# Patient Record
Sex: FEMALE | Race: WHITE | NOT HISPANIC OR LATINO | Employment: FULL TIME | URBAN - METROPOLITAN AREA
[De-identification: names, ages, dates, MRNs, and addresses within clinical notes are randomized per-mention and may not be internally consistent; named-entity substitution may affect disease eponyms.]

---

## 2018-10-10 ENCOUNTER — OFFICE VISIT (OUTPATIENT)
Dept: OBGYN CLINIC | Facility: CLINIC | Age: 28
End: 2018-10-10
Payer: COMMERCIAL

## 2018-10-10 VITALS
SYSTOLIC BLOOD PRESSURE: 124 MMHG | DIASTOLIC BLOOD PRESSURE: 78 MMHG | HEIGHT: 66 IN | WEIGHT: 201.6 LBS | BODY MASS INDEX: 32.4 KG/M2

## 2018-10-10 DIAGNOSIS — N91.2 AMENORRHEA: Primary | ICD-10-CM

## 2018-10-10 PROCEDURE — 76817 TRANSVAGINAL US OBSTETRIC: CPT | Performed by: OBSTETRICS & GYNECOLOGY

## 2018-10-10 PROCEDURE — 99213 OFFICE O/P EST LOW 20 MIN: CPT | Performed by: OBSTETRICS & GYNECOLOGY

## 2018-10-10 NOTE — PROGRESS NOTES
Assessment/Plan:      Diagnoses and all orders for this visit:    Amenorrhea        Ultrasound findings were reviewed with the patient and her   There is a single intrauterine pregnancy sac noted  There is a normal appearing yolk sac noted as well  Given these findings it is highly suggestive of a very early pregnancy  As we reviewed their suggested date of conception this would be consistent with a 4-5 week gestation  Patient will return to the office in 2 weeks for repeat sonographic evaluation to confirm viability  Subjective:     Patient ID: Josie Lane is a 33 y o  female  Patient presents as a new patient for evaluation of amenorrhea  Patient has a 1st a last menstrual period of August 1, 2018  She does note that she did have bleeding around the 1st week of September as well  She has not had menses since and has had positive home pregnancy test   She does note breast discomfort as well as some mild nausea  This is a happy event for the couple  Review of Systems   All other systems reviewed and are negative  Objective:     Physical Exam   Constitutional: She appears well-developed and well-nourished  Abdominal: Soft  There is no tenderness  Genitourinary: Vagina normal and uterus normal    Skin: Skin is warm and dry  Psychiatric: She has a normal mood and affect  Her behavior is normal  Judgment and thought content normal        Early OB Ultrasound Procedure Note  Josie Lane  YOB: 1990      Referring Physician: Self, Referral     Technician: Study performed by the interpreting physician    Indications:  amenorrhea   /78 (BP Location: Left arm, Patient Position: Sitting, Cuff Size: Standard)   Ht 5' 5 5" (1 664 m)   Wt 91 4 kg (201 lb 9 6 oz)   LMP 08/01/2018 (Exact Date)   BMI 33 04 kg/m²     Patient's last menstrual period was 08/01/2018 (exact date)  , , with EGA of  10 weeks and 0   days    Patient admits to much improved vaginal bleeding or brown discharge      Procedure Details  A gestational sac is seen containing a yolk sac and a segura embryo    Subchorionic lucency is absent     The embryonic crown-rump length measures   cm  and calculates to an estimated gestational age of n/a weeks and 0   days     Embryonic cardiac activity is  n/a  Description of fetal anatomy no fetal pole noted; nl yolk sac seen  Description of ovaries: normal  Description of uterus: normal    Findings:  Viable, segura intrauterine pregnancy at less than 5 weeks,  0 days, with a calculated EDC of unknown date

## 2018-10-24 ENCOUNTER — OFFICE VISIT (OUTPATIENT)
Dept: OBGYN CLINIC | Facility: CLINIC | Age: 28
End: 2018-10-24
Payer: COMMERCIAL

## 2018-10-24 VITALS — WEIGHT: 198.8 LBS | DIASTOLIC BLOOD PRESSURE: 62 MMHG | BODY MASS INDEX: 32.58 KG/M2 | SYSTOLIC BLOOD PRESSURE: 118 MMHG

## 2018-10-24 DIAGNOSIS — N91.2 AMENORRHEA: ICD-10-CM

## 2018-10-24 PROCEDURE — 99213 OFFICE O/P EST LOW 20 MIN: CPT | Performed by: PHYSICIAN ASSISTANT

## 2018-10-24 PROCEDURE — 76801 OB US < 14 WKS SINGLE FETUS: CPT | Performed by: PHYSICIAN ASSISTANT

## 2018-10-24 NOTE — PROGRESS NOTES
Early OB Ultrasound Procedure Note  Isabel Weber    HPI  Patient here for repeat US, she was here 2 wks ago but US not able to date pregnancy due to early size  Patient has h/o irregular menses  Referring Physician: No ref  provider found  Technician: Study performed by the interpreting physician assistant    Indications:  amenorrhea   /62 (BP Location: Left arm)   Wt 90 2 kg (198 lb 12 8 oz)   LMP 2018 (Exact Date)   BMI 32 58 kg/m²     Patient's last menstrual period was 2018 (exact date)  with EGA of  12 weeks and 0 days        Procedure Details  A gestational sac is seen containing a yolk sac and a segura embryo  The embryonic crown-rump length measures 1 05 cm  and calculates to an estimated gestational age of 9 weeks and 1   Days    Embryonic cardiac activity is present @ 129 BPM  Description of fetal anatomy Normal  Description of ovaries: normal  Description of uterus: normal    Findings:  Viable, segura intrauterine pregnancy at 7 weeks,  1 days, with a calculated EDC of 2019 by todays US

## 2018-10-24 NOTE — ASSESSMENT & PLAN NOTE
(+) viable IUP, size less than dates  EDMUND 6/11/19 by LMP  Patient congratulated and questions answered  RTO for interview and then PN1

## 2018-11-13 ENCOUNTER — INITIAL PRENATAL (OUTPATIENT)
Dept: OBGYN CLINIC | Facility: CLINIC | Age: 28
End: 2018-11-13

## 2018-11-13 VITALS
BODY MASS INDEX: 31.79 KG/M2 | HEIGHT: 66 IN | SYSTOLIC BLOOD PRESSURE: 110 MMHG | WEIGHT: 197.8 LBS | DIASTOLIC BLOOD PRESSURE: 68 MMHG

## 2018-11-13 DIAGNOSIS — Z34.01 ENCOUNTER FOR SUPERVISION OF NORMAL FIRST PREGNANCY IN FIRST TRIMESTER: Primary | ICD-10-CM

## 2018-11-13 PROCEDURE — OBC: Performed by: OBSTETRICS & GYNECOLOGY

## 2018-11-13 NOTE — PROGRESS NOTES
OB INTAKE INTERVIEW  * Pt presents for OB intake  * Accompanied by:  *  *Hx of  delivery prior to 36 weeks 6 days- no  *Last Menstrual Period: Pt's LMP was  *Ultrasound date: 10/24/18    7 weeks  1days  *Estimated date of delivery: 2018   * confirmed by  US    *Signs/Symptoms of Pregnancy   *constipation NO   *headaches no   *cramping/spotting no   *PICA cravings no  *Diabetes- if you answer yes, please order 1 hour GTT, 50g   *hx of GDM no   *BMI >35 no   *first degree relative with type 2 diabetes Yes   *hx of PCOS no   *current metformin use no   *prior hx of LGA/macrosomia no   *AMA with other risk factors no  *Hypertension- if you answer yes, please order preeclampsia labs including 24 hour urine protein   *Hx of chronic HTN no   *hx of gestational HTN no   *hx of preeclampsia, eclampsia, or HELLP syndrome no  *Immunizations:   *influenza vaccine given today no   *discussed Tdap vaccine- yes    *Interview education   *[facility] Pregnancy Essentials Book reviewed and discussed   *Handouts given:    *Baby and Me phone christopher guide    *Baby and Me support center    *[facility]     *discussed genetic testing- pt interested yes     *appointment at [facility] made yes    *Prenatal lab work scripts given to pt ; including one hour glucola  *I have these concerns about this prenatal patient: no    PN1 visit scheduled  The patient was oriented to our practice and all questions were answered  Pt & FOB happy with pregnancy  Pt to consider CF testing - information given & pt advised of NPO status one hour prior  Pt to consider flu vaccine, discussed importance of at visit       Interviewed by: Lord Ijeoma RN, [address] [place] [address]

## 2018-11-26 ENCOUNTER — APPOINTMENT (OUTPATIENT)
Dept: LAB | Facility: CLINIC | Age: 28
End: 2018-11-26
Payer: COMMERCIAL

## 2018-11-26 DIAGNOSIS — Z34.01 ENCOUNTER FOR SUPERVISION OF NORMAL FIRST PREGNANCY IN FIRST TRIMESTER: ICD-10-CM

## 2018-11-26 LAB
ABO GROUP BLD: NORMAL
BACTERIA UR QL AUTO: NORMAL /HPF
BASOPHILS # BLD AUTO: 0.03 THOUSANDS/ΜL (ref 0–0.1)
BASOPHILS NFR BLD AUTO: 0 % (ref 0–1)
BILIRUB UR QL STRIP: NEGATIVE
BLD GP AB SCN SERPL QL: NEGATIVE
CLARITY UR: CLEAR
COLOR UR: YELLOW
EOSINOPHIL # BLD AUTO: 0.16 THOUSAND/ΜL (ref 0–0.61)
EOSINOPHIL NFR BLD AUTO: 2 % (ref 0–6)
ERYTHROCYTE [DISTWIDTH] IN BLOOD BY AUTOMATED COUNT: 12.5 % (ref 11.6–15.1)
GLUCOSE 1H P 50 G GLC PO SERPL-MCNC: 112 MG/DL
GLUCOSE UR STRIP-MCNC: NEGATIVE MG/DL
HCT VFR BLD AUTO: 40.5 % (ref 34.8–46.1)
HGB BLD-MCNC: 13.3 G/DL (ref 11.5–15.4)
HGB UR QL STRIP.AUTO: NEGATIVE
HYALINE CASTS #/AREA URNS LPF: NORMAL /LPF
IMM GRANULOCYTES # BLD AUTO: 0.03 THOUSAND/UL (ref 0–0.2)
IMM GRANULOCYTES NFR BLD AUTO: 0 % (ref 0–2)
KETONES UR STRIP-MCNC: NEGATIVE MG/DL
LEUKOCYTE ESTERASE UR QL STRIP: NEGATIVE
LYMPHOCYTES # BLD AUTO: 1.87 THOUSANDS/ΜL (ref 0.6–4.47)
LYMPHOCYTES NFR BLD AUTO: 18 % (ref 14–44)
MCH RBC QN AUTO: 29.9 PG (ref 26.8–34.3)
MCHC RBC AUTO-ENTMCNC: 32.8 G/DL (ref 31.4–37.4)
MCV RBC AUTO: 91 FL (ref 82–98)
MONOCYTES # BLD AUTO: 0.47 THOUSAND/ΜL (ref 0.17–1.22)
MONOCYTES NFR BLD AUTO: 5 % (ref 4–12)
NEUTROPHILS # BLD AUTO: 7.72 THOUSANDS/ΜL (ref 1.85–7.62)
NEUTS SEG NFR BLD AUTO: 75 % (ref 43–75)
NITRITE UR QL STRIP: NEGATIVE
NON-SQ EPI CELLS URNS QL MICRO: NORMAL /HPF
NRBC BLD AUTO-RTO: 0 /100 WBCS
PH UR STRIP.AUTO: 7 [PH] (ref 4.5–8)
PLATELET # BLD AUTO: 280 THOUSANDS/UL (ref 149–390)
PMV BLD AUTO: 10.1 FL (ref 8.9–12.7)
PROT UR STRIP-MCNC: NEGATIVE MG/DL
RBC # BLD AUTO: 4.45 MILLION/UL (ref 3.81–5.12)
RBC #/AREA URNS AUTO: NORMAL /HPF
RH BLD: POSITIVE
RUBV IGG SERPL IA-ACNC: 40.6 IU/ML
SP GR UR STRIP.AUTO: 1.01 (ref 1–1.03)
SPECIMEN EXPIRATION DATE: NORMAL
UROBILINOGEN UR QL STRIP.AUTO: 0.2 E.U./DL
WBC # BLD AUTO: 10.28 THOUSAND/UL (ref 4.31–10.16)
WBC #/AREA URNS AUTO: NORMAL /HPF

## 2018-11-26 PROCEDURE — 80081 OBSTETRIC PANEL INC HIV TSTG: CPT

## 2018-11-26 PROCEDURE — 87086 URINE CULTURE/COLONY COUNT: CPT

## 2018-11-26 PROCEDURE — 36415 COLL VENOUS BLD VENIPUNCTURE: CPT

## 2018-11-26 PROCEDURE — 81001 URINALYSIS AUTO W/SCOPE: CPT

## 2018-11-26 PROCEDURE — 82950 GLUCOSE TEST: CPT

## 2018-11-27 LAB
HBV SURFACE AG SER QL: NORMAL
RPR SER QL: NORMAL

## 2018-11-28 LAB — BACTERIA UR CULT: NORMAL

## 2018-11-29 ENCOUNTER — INITIAL PRENATAL (OUTPATIENT)
Dept: OBGYN CLINIC | Facility: CLINIC | Age: 28
End: 2018-11-29

## 2018-11-29 VITALS — DIASTOLIC BLOOD PRESSURE: 68 MMHG | WEIGHT: 199 LBS | SYSTOLIC BLOOD PRESSURE: 116 MMHG | BODY MASS INDEX: 32.12 KG/M2

## 2018-11-29 DIAGNOSIS — Z34.90 PREGNANCY, UNSPECIFIED GESTATIONAL AGE: Primary | ICD-10-CM

## 2018-11-29 DIAGNOSIS — Z34.02 FIRST PREGNANCY, SECOND TRIMESTER: ICD-10-CM

## 2018-11-29 PROBLEM — N91.2 AMENORRHEA: Status: RESOLVED | Noted: 2018-10-10 | Resolved: 2018-11-29

## 2018-11-29 PROCEDURE — G0145 SCR C/V CYTO,THINLAYER,RESCR: HCPCS | Performed by: PHYSICIAN ASSISTANT

## 2018-11-29 PROCEDURE — PNV: Performed by: PHYSICIAN ASSISTANT

## 2018-11-29 NOTE — PROGRESS NOTES
Problem List Items Addressed This Visit        Other    First pregnancy, second trimester     33year old  here for first prenatal visit  Negative past medical history   Had nausea which has improved  Some mild cramping but no bleeding  Has genetic testing scheduled next week  First OB labs normal, blood type B+  Last Pap >5 years ago  Pap and cultures today              Other Visit Diagnoses     Pregnancy, unspecified gestational age    -  Primary    Relevant Orders    Liquid-based pap, screening    Chlamydia/GC amplified DNA by PCR

## 2018-11-29 NOTE — ASSESSMENT & PLAN NOTE
33year old  here for first prenatal visit  Negative past medical history   Had nausea which has improved  Some mild cramping but no bleeding  Has genetic testing scheduled next week  First OB labs normal, blood type B+  Last Pap >5 years ago  Pap and cultures today

## 2018-11-30 LAB — HIV 1+2 AB+HIV1 P24 AG SERPL QL IA: NORMAL

## 2018-12-03 ENCOUNTER — ROUTINE PRENATAL (OUTPATIENT)
Dept: PERINATAL CARE | Facility: CLINIC | Age: 28
End: 2018-12-03
Payer: COMMERCIAL

## 2018-12-03 VITALS
DIASTOLIC BLOOD PRESSURE: 59 MMHG | WEIGHT: 200 LBS | HEART RATE: 91 BPM | HEIGHT: 66 IN | BODY MASS INDEX: 32.14 KG/M2 | SYSTOLIC BLOOD PRESSURE: 140 MMHG

## 2018-12-03 DIAGNOSIS — O99.210 OBESITY AFFECTING PREGNANCY, ANTEPARTUM: Primary | ICD-10-CM

## 2018-12-03 DIAGNOSIS — Z36.82 ENCOUNTER FOR NUCHAL TRANSLUCENCY TESTING: ICD-10-CM

## 2018-12-03 DIAGNOSIS — Z34.01 ENCOUNTER FOR SUPERVISION OF NORMAL FIRST PREGNANCY IN FIRST TRIMESTER: ICD-10-CM

## 2018-12-03 DIAGNOSIS — Z3A.12 12 WEEKS GESTATION OF PREGNANCY: ICD-10-CM

## 2018-12-03 PROCEDURE — 76813 OB US NUCHAL MEAS 1 GEST: CPT | Performed by: OBSTETRICS & GYNECOLOGY

## 2018-12-03 PROCEDURE — 99241 PR OFFICE CONSULTATION NEW/ESTAB PATIENT 15 MIN: CPT | Performed by: OBSTETRICS & GYNECOLOGY

## 2018-12-03 RX ORDER — ASPIRIN 81 MG/1
81 TABLET ORAL DAILY
Qty: 100 TABLET | Refills: 3 | Status: SHIPPED | OUTPATIENT
Start: 2018-12-03 | End: 2018-12-31

## 2018-12-03 NOTE — PROGRESS NOTES
The patient was seen today for an ultrasound  Please see ultrasound report (located under Ob Procedures) for additional details  Thank you very much for allowing us to participate in the care of this very nice patient  Should you have any questions, please do not hesitate to contact me  Nikita Red MD [place]  Attending Physician, [place]

## 2018-12-05 LAB
LAB AP GYN PRIMARY INTERPRETATION: NORMAL
Lab: NORMAL

## 2018-12-14 ENCOUNTER — TELEPHONE (OUTPATIENT)
Dept: PERINATAL CARE | Facility: CLINIC | Age: 28
End: 2018-12-14

## 2018-12-28 ENCOUNTER — APPOINTMENT (OUTPATIENT)
Dept: LAB | Facility: HOSPITAL | Age: 28
End: 2018-12-28
Attending: OBSTETRICS & GYNECOLOGY
Payer: COMMERCIAL

## 2018-12-28 ENCOUNTER — ULTRASOUND (OUTPATIENT)
Dept: PERINATAL CARE | Facility: CLINIC | Age: 28
End: 2018-12-28
Payer: COMMERCIAL

## 2018-12-28 ENCOUNTER — ROUTINE PRENATAL (OUTPATIENT)
Dept: OBGYN CLINIC | Facility: CLINIC | Age: 28
End: 2018-12-28
Payer: COMMERCIAL

## 2018-12-28 VITALS — WEIGHT: 200.2 LBS | SYSTOLIC BLOOD PRESSURE: 122 MMHG | BODY MASS INDEX: 32.31 KG/M2 | DIASTOLIC BLOOD PRESSURE: 74 MMHG

## 2018-12-28 VITALS — SYSTOLIC BLOOD PRESSURE: 126 MMHG | HEART RATE: 109 BPM | DIASTOLIC BLOOD PRESSURE: 80 MMHG

## 2018-12-28 DIAGNOSIS — Z36.89 CONFIRM FETAL CARDIAC ACTIVITY USING ULTRASOUND: ICD-10-CM

## 2018-12-28 DIAGNOSIS — Z36.2: ICD-10-CM

## 2018-12-28 DIAGNOSIS — Z3A.16 16 WEEKS GESTATION OF PREGNANCY: ICD-10-CM

## 2018-12-28 DIAGNOSIS — O02.1 FETAL DEMISE BEFORE 20 WEEKS WITH RETENTION OF DEAD FETUS: Primary | ICD-10-CM

## 2018-12-28 DIAGNOSIS — Z34.92 ENCOUNTER FOR SUPERVISION OF NORMAL PREGNANCY IN SECOND TRIMESTER, UNSPECIFIED GRAVIDITY: Primary | ICD-10-CM

## 2018-12-28 DIAGNOSIS — O99.210 OBESITY AFFECTING PREGNANCY, ANTEPARTUM: ICD-10-CM

## 2018-12-28 DIAGNOSIS — Z34.91 ENCOUNTER FOR PREGNANCY RELATED EXAMINATION IN FIRST TRIMESTER: ICD-10-CM

## 2018-12-28 DIAGNOSIS — Z23 NEED FOR IMMUNIZATION AGAINST INFLUENZA: ICD-10-CM

## 2018-12-28 DIAGNOSIS — O02.1 FETAL DEMISE BEFORE 20 WEEKS WITH RETENTION OF DEAD FETUS: ICD-10-CM

## 2018-12-28 LAB — GLUCOSE AMN-MCNC: 39 MG/DL

## 2018-12-28 PROCEDURE — 76815 OB US LIMITED FETUS(S): CPT | Performed by: OBSTETRICS & GYNECOLOGY

## 2018-12-28 PROCEDURE — 87591 N.GONORRHOEAE DNA AMP PROB: CPT | Performed by: NURSE PRACTITIONER

## 2018-12-28 PROCEDURE — 82945 GLUCOSE OTHER FLUID: CPT

## 2018-12-28 PROCEDURE — 59000 AMNIOCENTESIS DIAGNOSTIC: CPT | Performed by: OBSTETRICS & GYNECOLOGY

## 2018-12-28 PROCEDURE — 90471 IMMUNIZATION ADMIN: CPT | Performed by: NURSE PRACTITIONER

## 2018-12-28 PROCEDURE — PNV: Performed by: NURSE PRACTITIONER

## 2018-12-28 PROCEDURE — 87205 SMEAR GRAM STAIN: CPT

## 2018-12-28 PROCEDURE — 76946 ECHO GUIDE FOR AMNIOCENTESIS: CPT | Performed by: OBSTETRICS & GYNECOLOGY

## 2018-12-28 PROCEDURE — 87491 CHLMYD TRACH DNA AMP PROBE: CPT | Performed by: NURSE PRACTITIONER

## 2018-12-28 PROCEDURE — 99243 OFF/OP CNSLTJ NEW/EST LOW 30: CPT | Performed by: OBSTETRICS & GYNECOLOGY

## 2018-12-28 PROCEDURE — 87070 CULTURE OTHR SPECIMN AEROBIC: CPT

## 2018-12-28 PROCEDURE — 90688 IIV4 VACCINE SPLT 0.5 ML IM: CPT | Performed by: NURSE PRACTITIONER

## 2018-12-28 NOTE — ASSESSMENT & PLAN NOTE
Absent heart tones on Doppler and no cardiac activity observed on bedside US  Discussed with patient and spouse and emotional support provided  Phone report to [facility] RN staff   To [facility] for further eval

## 2018-12-28 NOTE — PROGRESS NOTES
Amniocentesis performed with ultrasound guidance by Dr Nohemy Nguyen post time out with pt  Verbal and written discharge instructions given to pt and  post procedure  Pt receptive and verbalizes understanding   All questions answered and follow up appt made prior to discharge

## 2018-12-28 NOTE — PROGRESS NOTES
[id number] [address]: Ms Lyell Libman was seen today at 16w3d for viability scan; demise was confirmed and amniocentesis was performed     See ultrasound report under "OB Procedures" tab  The face-to-face time was 40 minutes  The majority of time (>50%) was spent counseling and/or coordinating care with the patient and/or family members  Please don't hesitate to contact our office with any concerns or questions    Latonia Pringle MD

## 2018-12-28 NOTE — ASSESSMENT & PLAN NOTE
Denies OB complaints  Denies contractions, cramping, leakage of fluid or vaginal bleeding  Flu vaccine was administered today

## 2018-12-28 NOTE — PATIENT INSTRUCTIONS
I am so sorry for your loss  We will call you as results of your amniocentesis return  Please be watchful for fever, cramping, bleeding, or any pain or discomfort  Your doctor's office will contact you regarding the next steps in management

## 2018-12-31 ENCOUNTER — OFFICE VISIT (OUTPATIENT)
Dept: OBGYN CLINIC | Facility: CLINIC | Age: 28
End: 2018-12-31
Payer: COMMERCIAL

## 2018-12-31 VITALS — SYSTOLIC BLOOD PRESSURE: 112 MMHG | BODY MASS INDEX: 32.28 KG/M2 | WEIGHT: 200 LBS | DIASTOLIC BLOOD PRESSURE: 64 MMHG

## 2018-12-31 DIAGNOSIS — O02.1 FETAL DEMISE BEFORE 20 WEEKS WITH RETENTION OF DEAD FETUS: Primary | ICD-10-CM

## 2018-12-31 LAB
BACTERIA SPEC BFLD CULT: NO GROWTH
C TRACH DNA SPEC QL NAA+PROBE: NEGATIVE
GRAM STN SPEC: NORMAL
GRAM STN SPEC: NORMAL
N GONORRHOEA DNA SPEC QL NAA+PROBE: NEGATIVE

## 2018-12-31 PROCEDURE — 99214 OFFICE O/P EST MOD 30 MIN: CPT | Performed by: OBSTETRICS & GYNECOLOGY

## 2018-12-31 NOTE — PROGRESS NOTES
Assessment/Plan:      Diagnoses and all orders for this visit:    Fetal demise before 20 weeks with retention of dead fetus        Extended discussion was had with the patient and her  regarding etiologies for 2nd trimester pregnancy loss  We also then reviewed management including expectant management, D and E, medically induced labor  We discussed the pros and cons of each approach  Patient does not have a clear idea as to how she would like to proceed after our extended discussion and would like to have more time to think about her options  She appears to be leaning toward D&E at today's visit as she has some concerns about and extended labor induction  We have decided that the couple will review their options over the next day and I have asked that she call 1st thing this coming Wednesday morning to the office to provide us with her decision  30 min was spent with this couple with greater than 50% of that afforded to counseling and coordination of care  Subjective:     Patient ID: Daisha Chung is a 33 y o  female  Patient returns with her  for discussion regarding her recently diagnosed pregnancy loss  Patient had confirmation of fetal demise at 17 weeks gestation  She was seen in the  center 3 days ago and underwent amniocentesis  Glucose values were in normal range  She has pending results for infection as well as genetics and PCR analysis for viral and bacterial studies  The patient is doing quite well emotionally  She notes minimal cramping  She has no bleeding  She has had a significant time to think about her options and comes today with a list of questions  Review of Systems   All other systems reviewed and are negative          Objective:     Physical Exam

## 2019-01-02 ENCOUNTER — ANESTHESIA EVENT (OUTPATIENT)
Dept: PERIOP | Facility: HOSPITAL | Age: 29
End: 2019-01-02
Payer: COMMERCIAL

## 2019-01-02 ENCOUNTER — OFFICE VISIT (OUTPATIENT)
Dept: OBGYN CLINIC | Facility: CLINIC | Age: 29
End: 2019-01-02
Payer: COMMERCIAL

## 2019-01-02 VITALS
BODY MASS INDEX: 33.32 KG/M2 | SYSTOLIC BLOOD PRESSURE: 122 MMHG | HEIGHT: 65 IN | WEIGHT: 200 LBS | DIASTOLIC BLOOD PRESSURE: 68 MMHG

## 2019-01-02 DIAGNOSIS — O02.1 FETAL DEMISE BEFORE 20 WEEKS WITH RETENTION OF DEAD FETUS: Primary | ICD-10-CM

## 2019-01-02 PROCEDURE — 99070 SPECIAL SUPPLIES PHYS/QHP: CPT | Performed by: OBSTETRICS & GYNECOLOGY

## 2019-01-02 PROCEDURE — 99214 OFFICE O/P EST MOD 30 MIN: CPT | Performed by: OBSTETRICS & GYNECOLOGY

## 2019-01-02 PROCEDURE — 59200 INSERT CERVICAL DILATOR: CPT | Performed by: OBSTETRICS & GYNECOLOGY

## 2019-01-02 RX ORDER — MISOPROSTOL 100 UG/1
400 TABLET ORAL ONCE
Status: CANCELLED | OUTPATIENT
Start: 2019-01-03

## 2019-01-02 RX ORDER — DOXYCYCLINE HYCLATE 50 MG/1
200 CAPSULE ORAL ONCE
Status: CANCELLED | OUTPATIENT
Start: 2019-01-03

## 2019-01-02 RX ORDER — SODIUM CHLORIDE, SODIUM LACTATE, POTASSIUM CHLORIDE, CALCIUM CHLORIDE 600; 310; 30; 20 MG/100ML; MG/100ML; MG/100ML; MG/100ML
125 INJECTION, SOLUTION INTRAVENOUS CONTINUOUS
Status: CANCELLED | OUTPATIENT
Start: 2019-01-02

## 2019-01-02 NOTE — PROGRESS NOTES
A/P: Pt is 33 y o  yo female with multiple anomalies for dilation and evacuation at 16 wks  Surgical coordinator to preauthorize and schedule  Patient to return tomorrow for procedure  Total visit time was 30 minutes, of which >50% was spent in counseling and coordination of care regarding the above problem in addition to the time spent on laminaria insertion  S: Patient is 33 y o   at 16+ wks gestation with fetal demise diagnosed on 18 at routine visit  She is a patient of [facility] and saw the [facility] as well to discuss management of this demise  She underwent amniocentesis on  with [facility] for genetic testing  She was counseled on her options for management which include continuation of the pregnancy, labor induction and dilation and evacuation  She wishes to proceed with dilation and evacuation  She was counseled regarding the intraoperative procedure as well as the post-operative expectations  She understands the risks of surgery including but not limited to bleeding, transfusion, infection, uterine perforation, injury to surrounding organs including bowel/bladder, laparoscopy and/or laparotomy if injury occurs  All questions were answered, and consent was signed  She was counseled about her options for disposition  She opts for private disposition  - she will let us know the  home tomorrow  Past Medical History:   Diagnosis Date    Urinary tract infection     hx of in the past    Varicella     hx of childhood chickenpox     Past Surgical History:   Procedure Laterality Date    NO PAST SURGERIES       Social History   No current outpatient prescriptions on file prior to visit  No current facility-administered medications on file prior to visit        No Known Allergies    O:   Vitals:    19 1231   BP: 122/68     CV: RRR  Lungs: CTA b/l  Abd: gravid; nontender  Extremities: no pain/swelling  Pelvic: nulliparous os    Procedure:  Laminaria insertion procedure was explained to the patient and she gave verbal consent for the procedure  She was placed in the dorsal lithotomy position  A speculum was inserted with good visualization of the cervix  The cervix was cleansed with Betadine  The anterior lip of the cervix was grasped with a single-tooth tenaculum  A paracervical block of 2% lidocaine without epinephrine was placed in divided doses at the 5 and 7 o'clock positions for a total of 10 cc  2XL, 2L, 1M, 1S (6 total) laminaria were inserted into the cervix without difficulty  Tenaculum was removed from the anterior lip of the cervix  Two saline soaked gauze sponges were placed into the vagina  The speculum was removed  The patient tolerated the procedure well

## 2019-01-03 ENCOUNTER — HOSPITAL ENCOUNTER (OUTPATIENT)
Facility: HOSPITAL | Age: 29
Setting detail: OUTPATIENT SURGERY
Discharge: HOME | End: 2019-01-03
Attending: OBSTETRICS & GYNECOLOGY | Admitting: OBSTETRICS & GYNECOLOGY
Payer: COMMERCIAL

## 2019-01-03 ENCOUNTER — ANESTHESIA (OUTPATIENT)
Dept: PERIOP | Facility: HOSPITAL | Age: 29
End: 2019-01-03
Payer: COMMERCIAL

## 2019-01-03 VITALS
DIASTOLIC BLOOD PRESSURE: 73 MMHG | WEIGHT: 200 LBS | OXYGEN SATURATION: 99 % | HEIGHT: 65 IN | SYSTOLIC BLOOD PRESSURE: 123 MMHG | BODY MASS INDEX: 33.32 KG/M2 | RESPIRATION RATE: 16 BRPM | HEART RATE: 111 BPM | TEMPERATURE: 99.3 F

## 2019-01-03 DIAGNOSIS — O02.1 FETAL DEMISE BEFORE 20 WEEKS WITH RETENTION OF DEAD FETUS: ICD-10-CM

## 2019-01-03 LAB
ABO GROUP BLD: NORMAL
ANION GAP SERPL CALCULATED.3IONS-SCNC: 8 MMOL/L (ref 4–13)
BLD GP AB SCN SERPL QL: NEGATIVE
BUN SERPL-MCNC: 9 MG/DL (ref 5–25)
CALCIUM SERPL-MCNC: 9.2 MG/DL (ref 8.3–10.1)
CHLORIDE SERPL-SCNC: 108 MMOL/L (ref 100–108)
CO2 SERPL-SCNC: 21 MMOL/L (ref 21–32)
CREAT SERPL-MCNC: 0.62 MG/DL (ref 0.6–1.3)
ERYTHROCYTE [DISTWIDTH] IN BLOOD BY AUTOMATED COUNT: 12.9 % (ref 11.6–15.1)
GFR SERPL CREATININE-BSD FRML MDRD: 122 ML/MIN/1.73SQ M
GLUCOSE P FAST SERPL-MCNC: 96 MG/DL (ref 65–99)
GLUCOSE SERPL-MCNC: 96 MG/DL (ref 65–140)
HCT VFR BLD AUTO: 39 % (ref 34.8–46.1)
HGB BLD-MCNC: 13 G/DL (ref 11.5–15.4)
MCH RBC QN AUTO: 29.9 PG (ref 26.8–34.3)
MCHC RBC AUTO-ENTMCNC: 33.3 G/DL (ref 31.4–37.4)
MCV RBC AUTO: 90 FL (ref 82–98)
PLATELET # BLD AUTO: 235 THOUSANDS/UL (ref 149–390)
PMV BLD AUTO: 9.4 FL (ref 8.9–12.7)
POTASSIUM SERPL-SCNC: 3.8 MMOL/L (ref 3.5–5.3)
RBC # BLD AUTO: 4.35 MILLION/UL (ref 3.81–5.12)
RH BLD: POSITIVE
SODIUM SERPL-SCNC: 137 MMOL/L (ref 136–145)
SPECIMEN EXPIRATION DATE: NORMAL
WBC # BLD AUTO: 14.34 THOUSAND/UL (ref 4.31–10.16)

## 2019-01-03 PROCEDURE — 86900 BLOOD TYPING SEROLOGIC ABO: CPT | Performed by: OBSTETRICS & GYNECOLOGY

## 2019-01-03 PROCEDURE — 85732 THROMBOPLASTIN TIME PARTIAL: CPT | Performed by: OBSTETRICS & GYNECOLOGY

## 2019-01-03 PROCEDURE — 86850 RBC ANTIBODY SCREEN: CPT | Performed by: OBSTETRICS & GYNECOLOGY

## 2019-01-03 PROCEDURE — 86146 BETA-2 GLYCOPROTEIN ANTIBODY: CPT | Performed by: OBSTETRICS & GYNECOLOGY

## 2019-01-03 PROCEDURE — 86147 CARDIOLIPIN ANTIBODY EA IG: CPT | Performed by: OBSTETRICS & GYNECOLOGY

## 2019-01-03 PROCEDURE — 85613 RUSSELL VIPER VENOM DILUTED: CPT | Performed by: OBSTETRICS & GYNECOLOGY

## 2019-01-03 PROCEDURE — 88305 TISSUE EXAM BY PATHOLOGIST: CPT | Performed by: PATHOLOGY

## 2019-01-03 PROCEDURE — 59821 TREATMENT OF MISCARRIAGE: CPT | Performed by: OBSTETRICS & GYNECOLOGY

## 2019-01-03 PROCEDURE — 86901 BLOOD TYPING SEROLOGIC RH(D): CPT | Performed by: OBSTETRICS & GYNECOLOGY

## 2019-01-03 PROCEDURE — 85705 THROMBOPLASTIN INHIBITION: CPT | Performed by: OBSTETRICS & GYNECOLOGY

## 2019-01-03 PROCEDURE — 80048 BASIC METABOLIC PNL TOTAL CA: CPT | Performed by: OBSTETRICS & GYNECOLOGY

## 2019-01-03 PROCEDURE — 85670 THROMBIN TIME PLASMA: CPT | Performed by: OBSTETRICS & GYNECOLOGY

## 2019-01-03 PROCEDURE — 85027 COMPLETE CBC AUTOMATED: CPT | Performed by: OBSTETRICS & GYNECOLOGY

## 2019-01-03 RX ORDER — FENTANYL CITRATE 50 UG/ML
INJECTION, SOLUTION INTRAMUSCULAR; INTRAVENOUS AS NEEDED
Status: DISCONTINUED | OUTPATIENT
Start: 2019-01-03 | End: 2019-01-03 | Stop reason: SURG

## 2019-01-03 RX ORDER — ONDANSETRON 2 MG/ML
4 INJECTION INTRAMUSCULAR; INTRAVENOUS EVERY 6 HOURS PRN
Status: DISCONTINUED | OUTPATIENT
Start: 2019-01-03 | End: 2019-01-03 | Stop reason: HOSPADM

## 2019-01-03 RX ORDER — ONDANSETRON 2 MG/ML
4 INJECTION INTRAMUSCULAR; INTRAVENOUS ONCE AS NEEDED
Status: DISCONTINUED | OUTPATIENT
Start: 2019-01-03 | End: 2019-01-03 | Stop reason: HOSPADM

## 2019-01-03 RX ORDER — METOCLOPRAMIDE HYDROCHLORIDE 5 MG/ML
10 INJECTION INTRAMUSCULAR; INTRAVENOUS ONCE AS NEEDED
Status: DISCONTINUED | OUTPATIENT
Start: 2019-01-03 | End: 2019-01-03 | Stop reason: HOSPADM

## 2019-01-03 RX ORDER — MIDAZOLAM HYDROCHLORIDE 1 MG/ML
INJECTION INTRAMUSCULAR; INTRAVENOUS AS NEEDED
Status: DISCONTINUED | OUTPATIENT
Start: 2019-01-03 | End: 2019-01-03 | Stop reason: SURG

## 2019-01-03 RX ORDER — SODIUM CHLORIDE, SODIUM LACTATE, POTASSIUM CHLORIDE, CALCIUM CHLORIDE 600; 310; 30; 20 MG/100ML; MG/100ML; MG/100ML; MG/100ML
125 INJECTION, SOLUTION INTRAVENOUS CONTINUOUS
Status: DISCONTINUED | OUTPATIENT
Start: 2019-01-03 | End: 2019-01-03 | Stop reason: HOSPADM

## 2019-01-03 RX ORDER — DOXYCYCLINE HYCLATE 100 MG/1
200 CAPSULE ORAL ONCE
Status: DISCONTINUED | OUTPATIENT
Start: 2019-01-03 | End: 2019-01-03

## 2019-01-03 RX ORDER — SODIUM CHLORIDE, SODIUM LACTATE, POTASSIUM CHLORIDE, CALCIUM CHLORIDE 600; 310; 30; 20 MG/100ML; MG/100ML; MG/100ML; MG/100ML
125 INJECTION, SOLUTION INTRAVENOUS CONTINUOUS
Status: DISCONTINUED | OUTPATIENT
Start: 2019-01-03 | End: 2019-01-03

## 2019-01-03 RX ORDER — PROPOFOL 10 MG/ML
INJECTION, EMULSION INTRAVENOUS AS NEEDED
Status: DISCONTINUED | OUTPATIENT
Start: 2019-01-03 | End: 2019-01-03 | Stop reason: SURG

## 2019-01-03 RX ORDER — METHYLERGONOVINE MALEATE 0.2 MG/ML
INJECTION INTRAVENOUS AS NEEDED
Status: DISCONTINUED | OUTPATIENT
Start: 2019-01-03 | End: 2019-01-03 | Stop reason: SURG

## 2019-01-03 RX ORDER — DOXYCYCLINE HYCLATE 100 MG/1
100 CAPSULE ORAL ONCE
Status: COMPLETED | OUTPATIENT
Start: 2019-01-03 | End: 2019-01-03

## 2019-01-03 RX ORDER — IBUPROFEN 400 MG/1
600 TABLET ORAL EVERY 6 HOURS PRN
Status: DISCONTINUED | OUTPATIENT
Start: 2019-01-03 | End: 2019-01-03 | Stop reason: HOSPADM

## 2019-01-03 RX ORDER — FENTANYL CITRATE/PF 50 MCG/ML
50 SYRINGE (ML) INJECTION
Status: DISCONTINUED | OUTPATIENT
Start: 2019-01-03 | End: 2019-01-03 | Stop reason: HOSPADM

## 2019-01-03 RX ORDER — ACETAMINOPHEN 500 MG
500 TABLET ORAL EVERY 6 HOURS PRN
COMMUNITY
End: 2019-02-01 | Stop reason: ALTCHOICE

## 2019-01-03 RX ORDER — ONDANSETRON 2 MG/ML
INJECTION INTRAMUSCULAR; INTRAVENOUS AS NEEDED
Status: DISCONTINUED | OUTPATIENT
Start: 2019-01-03 | End: 2019-01-03 | Stop reason: SURG

## 2019-01-03 RX ORDER — SUCCINYLCHOLINE CHLORIDE 20 MG/ML
INJECTION INTRAMUSCULAR; INTRAVENOUS AS NEEDED
Status: DISCONTINUED | OUTPATIENT
Start: 2019-01-03 | End: 2019-01-03 | Stop reason: SURG

## 2019-01-03 RX ORDER — DOXYCYCLINE HYCLATE 100 MG/1
200 CAPSULE ORAL ONCE
Status: COMPLETED | OUTPATIENT
Start: 2019-01-03 | End: 2019-01-03

## 2019-01-03 RX ORDER — MISOPROSTOL 200 UG/1
400 TABLET ORAL ONCE
Status: COMPLETED | OUTPATIENT
Start: 2019-01-03 | End: 2019-01-03

## 2019-01-03 RX ORDER — ACETAMINOPHEN 325 MG/1
650 TABLET ORAL EVERY 6 HOURS PRN
Status: DISCONTINUED | OUTPATIENT
Start: 2019-01-03 | End: 2019-01-03 | Stop reason: HOSPADM

## 2019-01-03 RX ORDER — LIDOCAINE HYDROCHLORIDE 10 MG/ML
INJECTION, SOLUTION INFILTRATION; PERINEURAL AS NEEDED
Status: DISCONTINUED | OUTPATIENT
Start: 2019-01-03 | End: 2019-01-03 | Stop reason: SURG

## 2019-01-03 RX ADMIN — SODIUM CHLORIDE, SODIUM LACTATE, POTASSIUM CHLORIDE, AND CALCIUM CHLORIDE: .6; .31; .03; .02 INJECTION, SOLUTION INTRAVENOUS at 07:41

## 2019-01-03 RX ADMIN — PROPOFOL 200 MG: 10 INJECTION, EMULSION INTRAVENOUS at 08:15

## 2019-01-03 RX ADMIN — FENTANYL CITRATE 50 MCG: 50 INJECTION, SOLUTION INTRAMUSCULAR; INTRAVENOUS at 08:15

## 2019-01-03 RX ADMIN — LIDOCAINE HYDROCHLORIDE 50 MG: 10 INJECTION, SOLUTION INFILTRATION; PERINEURAL at 08:11

## 2019-01-03 RX ADMIN — FENTANYL CITRATE 50 MCG: 50 INJECTION, SOLUTION INTRAMUSCULAR; INTRAVENOUS at 09:45

## 2019-01-03 RX ADMIN — PROPOFOL 200 MG: 10 INJECTION, EMULSION INTRAVENOUS at 08:11

## 2019-01-03 RX ADMIN — DOXYCYCLINE 100 MG: 100 CAPSULE ORAL at 07:38

## 2019-01-03 RX ADMIN — MISOPROSTOL 400 MCG: 200 TABLET ORAL at 07:39

## 2019-01-03 RX ADMIN — FENTANYL CITRATE 50 MCG: 50 INJECTION, SOLUTION INTRAMUSCULAR; INTRAVENOUS at 09:52

## 2019-01-03 RX ADMIN — IBUPROFEN 600 MG: 400 TABLET ORAL at 10:48

## 2019-01-03 RX ADMIN — FENTANYL CITRATE 50 MCG: 50 INJECTION, SOLUTION INTRAMUSCULAR; INTRAVENOUS at 08:29

## 2019-01-03 RX ADMIN — SODIUM CHLORIDE, SODIUM LACTATE, POTASSIUM CHLORIDE, AND CALCIUM CHLORIDE 125 ML/HR: .6; .31; .03; .02 INJECTION, SOLUTION INTRAVENOUS at 09:54

## 2019-01-03 RX ADMIN — MIDAZOLAM 2 MG: 1 INJECTION INTRAMUSCULAR; INTRAVENOUS at 07:42

## 2019-01-03 RX ADMIN — DOXYCYCLINE 200 MG: 100 CAPSULE ORAL at 10:10

## 2019-01-03 RX ADMIN — METHYLERGONOVINE MALEATE 0.2 MG: 0.2 INJECTION, SOLUTION INTRAMUSCULAR; INTRAVENOUS at 08:59

## 2019-01-03 RX ADMIN — FENTANYL CITRATE 50 MCG: 50 INJECTION, SOLUTION INTRAMUSCULAR; INTRAVENOUS at 09:10

## 2019-01-03 RX ADMIN — FENTANYL CITRATE 50 MCG: 50 INJECTION, SOLUTION INTRAMUSCULAR; INTRAVENOUS at 08:11

## 2019-01-03 RX ADMIN — ONDANSETRON 4 MG: 2 INJECTION INTRAMUSCULAR; INTRAVENOUS at 08:30

## 2019-01-03 RX ADMIN — SUCCINYLCHOLINE CHLORIDE 60 MG: 20 INJECTION, SOLUTION INTRAMUSCULAR; INTRAVENOUS at 08:15

## 2019-01-03 RX ADMIN — SUCCINYLCHOLINE CHLORIDE 100 MG: 20 INJECTION, SOLUTION INTRAMUSCULAR; INTRAVENOUS at 08:12

## 2019-01-03 NOTE — H&P (VIEW-ONLY)
A/P: Pt is 33 y o  yo female with multiple anomalies for dilation and evacuation at 16 wks  Surgical coordinator to preauthorize and schedule  Patient to return tomorrow for procedure  Total visit time was 30 minutes, of which >50% was spent in counseling and coordination of care regarding the above problem in addition to the time spent on laminaria insertion  S: Patient is 33 y o   at 16+ wks gestation with fetal demise diagnosed on 18 at routine visit  She is a patient of [facility] and saw the [facility] as well to discuss management of this demise  She underwent amniocentesis on  with [facility] for genetic testing  She was counseled on her options for management which include continuation of the pregnancy, labor induction and dilation and evacuation  She wishes to proceed with dilation and evacuation  She was counseled regarding the intraoperative procedure as well as the post-operative expectations  She understands the risks of surgery including but not limited to bleeding, transfusion, infection, uterine perforation, injury to surrounding organs including bowel/bladder, laparoscopy and/or laparotomy if injury occurs  All questions were answered, and consent was signed  She was counseled about her options for disposition  She opts for private disposition  - she will let us know the  home tomorrow  Past Medical History:   Diagnosis Date    Urinary tract infection     hx of in the past    Varicella     hx of childhood chickenpox     Past Surgical History:   Procedure Laterality Date    NO PAST SURGERIES       Social History   No current outpatient prescriptions on file prior to visit  No current facility-administered medications on file prior to visit        No Known Allergies    O:   Vitals:    19 1231   BP: 122/68     CV: RRR  Lungs: CTA b/l  Abd: gravid; nontender  Extremities: no pain/swelling  Pelvic: nulliparous os    Procedure:  Laminaria insertion procedure was explained to the patient and she gave verbal consent for the procedure  She was placed in the dorsal lithotomy position  A speculum was inserted with good visualization of the cervix  The cervix was cleansed with Betadine  The anterior lip of the cervix was grasped with a single-tooth tenaculum  A paracervical block of 2% lidocaine without epinephrine was placed in divided doses at the 5 and 7 o'clock positions for a total of 10 cc  2XL, 2L, 1M, 1S (6 total) laminaria were inserted into the cervix without difficulty  Tenaculum was removed from the anterior lip of the cervix  Two saline soaked gauze sponges were placed into the vagina  The speculum was removed  The patient tolerated the procedure well

## 2019-01-03 NOTE — ANESTHESIA PREPROCEDURE EVALUATION
Review of Systems/Medical History  Patient summary reviewed  Chart reviewed  No history of anesthetic complications     Cardiovascular  Negative cardio ROS    Pulmonary  Negative pulmonary ROS        GI/Hepatic  Negative GI/hepatic ROS               Endo/Other     GYN       Hematology   Musculoskeletal       Neurology   Psychology           Physical Exam    Airway    Mallampati score: II  TM Distance: >3 FB  Neck ROM: full     Dental       Cardiovascular  Comment: Negative ROS,     Pulmonary      Other Findings        Anesthesia Plan  ASA Score- 2     Anesthesia Type- general with ASA Monitors  Additional Monitors:   Airway Plan: ETT  Plan Factors-    Induction- intravenous  Postoperative Plan-     Informed Consent- Anesthetic plan and risks discussed with patient  I personally reviewed this patient with the CRNA  Discussed and agreed on the Anesthesia Plan with the CRNA  Karrie Nissen

## 2019-01-03 NOTE — OP NOTE
OPERATIVE REPORT  PATIENT NAME: Kizzy Valencia    :  1990  MRN: [id number]  Pt Location:  OR ROOM 03    SURGERY DATE: 1/3/2019    Surgeon(s) and Role:     Tamara Fairchild DO - Assisting     * Tonya De Oliveira MD - Primary    Preop Diagnosis:  Fetal demise before 20 weeks with retention of dead fetus [O02 1]    Post-Op Diagnosis Codes:     * Fetal demise before 20 weeks with retention of dead fetus [O02 1]    Procedure(s) (LRB):  DILATATION AND EVACUATION (D&E) (16 week fetal demise) ultra sound guidance (N/A)    Specimen(s):  ID Type Source Tests Collected by Time Destination   1 : placenta tissue exam non OB Tissue Other TISSUE EXAM Reilly Hodge MD 1/3/2019 0900        Estimated Blood Loss:   500 mL    Drains:   100 cc clear yellow urine    Anesthesia Type:   Choice    Operative Indications:  Fetal demise before 20 weeks with retention of dead fetus [O02 1]    Operative Findings:  2 sponges and 6 laminaria removed prior to procedure  16 week uterine size  Products of conception and placenta    Complications:   None    PROCEDURE:  Brief History    All risks, benefits, and alternatives to the procedure were discussed with the patient and she had the opportunity to ask questions  Informed consent was obtained  Description of Procedure    Patient was taken to the operating room were a time out was performed to confirm correct patient and correct procedure  It was confirmed that patient received 100mg PO doxycycline prior to the procedure for infection prophylaxis  General LMA anesthesia (LMA) was administered and the patient was positioned on the OR table in the dorsal lithotomy position  All pressure points were padded  6 laminaria and 2 sponges were removed from the cervix  On exam, cervix was notably 2cm dilated  The fundus was palpated at approximately 16 week gestational size   The vagina was prepped with Betadine and the patient draped in the usual sterile fashion  Operative Technique    A straight catheter was introduced into the bladder, which was drained of 100cc of clear yellow urine  A weighted speculum was inserted into the vagina and a Dueñas retractor was used to visualize the anterior lip of the cervix, which was then grasped with a single toothed tenaculum  Under ultrasound guidance, products of conception were evacuated in entirety and sent to pathology for evaluation  A #14 curved suction curette was inserted gently into the cervix  Suction curettage was then performed with ultrasound guidance and the placenta was completely evacuated  The patient received 0 2mg of IM methergine to improve uterine tone as atony was noted after the procedure  Bimanual massage was performed and the uterus was noted to be firm and bleeding hemostatic  At the conclusion of the procedure, all needle, sponge, and instrument counts were noted to be correct x2  Patient tolerated the procedure well and was transferred to PACU in stable condition prior to discharge with follow up in 1-2 weeks  Doxycycline 200mg PO was ordered post-procedure for infection prophylaxis  Dr Josefa Burris was present and participated in all key portions of the case      SIGNATURE: Rogelio Montiel DO  DATE: January 3, 2019  TIME: 9:16 AM

## 2019-01-03 NOTE — OR NURSING
Fetal demise D &E [phone], [facility]  office called  Henry notified as well as hospital supervisor ( private dispostion)  Yessica 77 office contact

## 2019-01-03 NOTE — DISCHARGE INSTRUCTIONS
Dilation and Curettage   WHAT YOU NEED TO KNOW:   Dilation and curettage (D&C) is a procedure to remove tissue from the lining of your uterus  DISCHARGE INSTRUCTIONS:   Call 911 for any of the following:   · You have signs of an allergic reaction, such as hives, trouble breathing, or severe swelling  Seek care immediately if:   · You have heavy vaginal bleeding that soaks 1 pad in 1 hour for 2 hours in a row  · You have a fever higher than 100 4°F (38°C)  · You have abdominal cramps for more than 2 days  · Your pain does not get better, even after treatment  Contact your healthcare provider if:   · You have foul-smelling vaginal discharge  You do not get your monthly period  · You have questions or concerns about your condition or care  Medicines: You may need any of the following:  · Prescription pain medicine  may be given  Do not wait until the pain is severe before you take your medicine  Ask your healthcare provider how to take this medicine safely  · Antibiotics  help fight or prevent a bacterial infection  · Take your medicine as directed  Contact your healthcare provider if you think your medicine is not helping or if you have side effects  Tell him or her if you are allergic to any medicine  Keep a list of the medicines, vitamins, and herbs you take  Include the amounts, and when and why you take them  Bring the list or the pill bottles to follow-up visits  Carry your medicine list with you in case of an emergency  Self-care:   · Use sanitary pads if needed  You may have light bleeding for up to 2 weeks  Do not use tampons  Use sanitary pads instead  This will help prevent a vaginal infection  · Rest as needed  Slowly start to do more each day  Return to your daily activities as directed  · Do not have sex for at least 2 weeks after the procedure  This will help prevent an infection  · Use birth control right after your procedure    Your monthly period should start again in 4 to 8 weeks  During this time, you could still ovulate (release an egg)  Use birth control as directed to prevent pregnancy during this time  Follow up with your healthcare provider as directed:  Write down your questions so you remember to ask them during your visits  © 2017 [employer] Information is for End User's use only and may not be sold, redistributed or otherwise used for commercial purposes  All illustrations and images included in CareNotes® are the copyrighted property of [employer] , [employer]  or [employer]man[employer] Garcia  The above information is an  only  It is not intended as medical advice for individual conditions or treatments  Talk to your doctor, nurse or pharmacist before following any medical regimen to see if it is safe and effective for you

## 2019-01-03 NOTE — ANESTHESIA POSTPROCEDURE EVALUATION
Post-Op Assessment Note      CV Status:  Stable    Mental Status:  Alert and awake    Hydration Status:  Euvolemic    PONV Controlled:  Controlled    Airway Patency:  Patent  Airway: intubated    Post Op Vitals Reviewed: Yes          Staff: CRNA           BP (P) 104/77 (01/03/19 0915)    Temp (!) (P) 96 3 °F (35 7 °C) (01/03/19 0915)    Pulse (P) 97 (01/03/19 0915)   Resp (P) 20 (01/03/19 0915)    SpO2   100%

## 2019-01-04 LAB
CARDIOLIPIN IGA SER IA-ACNC: <9 APL U/ML (ref 0–11)
CARDIOLIPIN IGG SER IA-ACNC: <9 GPL U/ML (ref 0–14)
CARDIOLIPIN IGM SER IA-ACNC: <9 MPL U/ML (ref 0–12)

## 2019-01-05 LAB
APTT SCREEN TO CONFIRM RATIO: 1.4 RATIO (ref 0–1.4)
B2 GLYCOPROT1 IGA SER-ACNC: <9 GPI IGA UNITS (ref 0–25)
B2 GLYCOPROT1 IGG SER-ACNC: <9 GPI IGG UNITS (ref 0–20)
B2 GLYCOPROT1 IGM SER-ACNC: <9 GPI IGM UNITS (ref 0–32)
CONFIRM APTT/NORMAL: 44.2 SEC (ref 0–55)
LA PPP-IMP: NORMAL
SCREEN APTT: 29 SEC (ref 0–51.9)
SCREEN DRVVT: 44.1 SEC (ref 0–47)
THROMBIN TIME: 15.1 SEC (ref 0–23)

## 2019-01-08 ENCOUNTER — DOCUMENTATION (OUTPATIENT)
Dept: PERINATAL CARE | Facility: CLINIC | Age: 29
End: 2019-01-08

## 2019-01-08 NOTE — PROGRESS NOTES
Returned call to [employer] at lab [employer]  she was unavailable message left to call back

## 2019-01-09 ENCOUNTER — DOCUMENTATION (OUTPATIENT)
Dept: PERINATAL CARE | Facility: CLINIC | Age: 29
End: 2019-01-09

## 2019-01-09 NOTE — PROGRESS NOTES
Returned phone call to Julio regarding question of gender and amniotic fluid  Voicemail received  Message left to return call

## 2019-01-11 ENCOUNTER — DOCUMENTATION (OUTPATIENT)
Dept: PERINATAL CARE | Facility: CLINIC | Age: 29
End: 2019-01-11

## 2019-01-11 NOTE — PROGRESS NOTES
T/C to Dr Paty Pedraza at [employer] office with microarray results  Microarray results faxed to Dr Paty Pedraza at [employer] office per her request  Dr Paty Pedraza to contact pt

## 2019-01-14 LAB
ARRAY TYPE: NORMAL
CHROM ANALY INTERPHASE BLD FISH-IMP: NORMAL
CHROMOSOME BLD/T: NORMAL
CMV PCR DETECT.,AMNIOTIC FLUID: NEGATIVE
GENOTYPING TARGETS: NORMAL
LAB DIRECTOR NAME PROVIDER: NORMAL
LABCORP COMMENT: NORMAL
Lab: NEGATIVE
SPECIMEN SOURCE: NORMAL
T GONDII DNA AMN QL NAA+PROBE: NEGATIVE
VIRUS SPEC CULT: NORMAL

## 2019-02-01 ENCOUNTER — OFFICE VISIT (OUTPATIENT)
Dept: OBGYN CLINIC | Facility: CLINIC | Age: 29
End: 2019-02-01

## 2019-02-01 VITALS — BODY MASS INDEX: 34.11 KG/M2 | WEIGHT: 205 LBS | SYSTOLIC BLOOD PRESSURE: 110 MMHG | DIASTOLIC BLOOD PRESSURE: 70 MMHG

## 2019-02-01 PROBLEM — O02.1 FETAL DEMISE BEFORE 20 WEEKS WITH RETENTION OF DEAD FETUS: Status: RESOLVED | Noted: 2018-12-31 | Resolved: 2019-02-01

## 2019-02-01 PROBLEM — O99.210 OBESITY AFFECTING PREGNANCY, ANTEPARTUM: Status: RESOLVED | Noted: 2018-12-03 | Resolved: 2019-02-01

## 2019-02-01 PROBLEM — Z36.89 CONFIRM FETAL CARDIAC ACTIVITY USING ULTRASOUND: Status: RESOLVED | Noted: 2018-12-28 | Resolved: 2019-02-01

## 2019-02-01 PROBLEM — Z34.92 SUPERVISION OF NORMAL PREGNANCY IN SECOND TRIMESTER: Status: RESOLVED | Noted: 2018-11-29 | Resolved: 2019-02-01

## 2019-02-01 PROCEDURE — 99024 POSTOP FOLLOW-UP VISIT: CPT | Performed by: OBSTETRICS & GYNECOLOGY

## 2019-02-01 NOTE — PROGRESS NOTES
Post - Operative Visit    Shahbaz Cowart is a 33 y o  female here for post-operative visit  She is s/p D+E on 1/3/2019  Her surgery was uncomplicated  Her post-operative course has been uneventful  She denies fevers  She has no bowel or bladder concerns  She has no vaginal discharge or concerning bleeding  Discussed expectations for first menses  She has stopped her PNV but will restart  Aware to wait until one normal menses to start having unprotected intercourse again  Will plan 8 week ultrasound for next pregnancy  She denies signs of postpartum depression and reports a great support system at home  Reviewed negative work-up for APLS as well as normal karyotype on microarray  She is aware this puts her at low likelihood for problems in a subsequent pregnancy  We will plan for 12-14 week consult/ultrasound with PNC in subsequent pregnancy as well  Review of Systems - Negative    Past Medical History:   Diagnosis Date    Urinary tract infection     hx of in the past    Varicella     hx of childhood chickenpox     Past Surgical History:   Procedure Laterality Date    NO PAST SURGERIES      ME SURG RX MISSED ABORTN,1ST TRI N/A 1/3/2019    Procedure: DILATATION AND EVACUATION (D&E) (16 week fetal demise) ultra soung guidance;  Surgeon: Gera Gerardo MD;  Location: [facility];  Service: Gynecology       Objective:  /70 (BP Location: Left arm, Patient Position: Sitting, Cuff Size: Large)   Wt 93 kg (205 lb)   LMP 2018 (Exact Date)   BMI 34 11 kg/m²   Physical Exam   Genitourinary: Vagina normal and uterus normal  No vaginal discharge found  A:  33 y o  s/p D+E  - doing well post-operatively    P:  Reassurance given  Resume PNV   loss resources offered - declined at this time but will call if she reconsiders  Call with positive home pregnancy test for 8 week ultrasound in next pregnancy

## 2019-03-13 ENCOUNTER — TELEPHONE (OUTPATIENT)
Dept: PERINATAL CARE | Facility: CLINIC | Age: 29
End: 2019-03-13

## 2019-03-13 NOTE — LETTER
Name: Aldo Conde  : 1990  MRN: [id number]    To:   Centralized Faxing Team [phone]      The attached documents are complete  Please scan and add into the patient's chart  No other action is needed at this time  Please call us with any questions at [phone]      Parul Person RN

## 2019-12-30 ENCOUNTER — TELEPHONE (OUTPATIENT)
Dept: OBGYN CLINIC | Facility: CLINIC | Age: 29
End: 2019-12-30

## 2019-12-30 NOTE — TELEPHONE ENCOUNTER
Lat visit post partum on 02/01/19-I returned pt call she has been having irregular bleeding for months  04/22 had cycle -for 7 days normal then didn't have it may or June 07/24 had cycle for 2 weeks, then again on 09/25 for 3 weeks-Bleeding began 11/29- has been bleeding daily with tampon pad and liner --not heavy per pt  Color ranges from brown to red  Pt doesn't know when she is ovulating anymore- has been trying to get pregnant since march  Pt advised needs visit to address multiple issues  Pt agreed but asked to be scheduled with KK  I scheduled pt with KK for tomorrow at 1 pm  Pt was grateful

## 2019-12-31 ENCOUNTER — OFFICE VISIT (OUTPATIENT)
Dept: OBGYN CLINIC | Facility: CLINIC | Age: 29
End: 2019-12-31
Payer: COMMERCIAL

## 2019-12-31 VITALS — SYSTOLIC BLOOD PRESSURE: 128 MMHG | WEIGHT: 222.4 LBS | DIASTOLIC BLOOD PRESSURE: 86 MMHG | BODY MASS INDEX: 37.01 KG/M2

## 2019-12-31 DIAGNOSIS — N93.9 ABNORMAL BLEEDING IN MENSTRUAL CYCLE: Primary | ICD-10-CM

## 2019-12-31 DIAGNOSIS — Z31.69 ENCOUNTER FOR PRECONCEPTION CONSULTATION: ICD-10-CM

## 2019-12-31 PROBLEM — N92.6 ABNORMAL BLEEDING IN MENSTRUAL CYCLE: Status: ACTIVE | Noted: 2019-12-31

## 2019-12-31 LAB — SL AMB POCT URINE HCG: NORMAL

## 2019-12-31 PROCEDURE — 81025 URINE PREGNANCY TEST: CPT | Performed by: NURSE PRACTITIONER

## 2019-12-31 PROCEDURE — 99214 OFFICE O/P EST MOD 30 MIN: CPT | Performed by: NURSE PRACTITIONER

## 2019-12-31 RX ORDER — MEDROXYPROGESTERONE ACETATE 10 MG/1
10 TABLET ORAL DAILY
Qty: 10 TABLET | Refills: 0 | Status: SHIPPED | OUTPATIENT
Start: 2019-12-31 | End: 2021-06-24 | Stop reason: HOSPADM

## 2019-12-31 RX ORDER — OMEPRAZOLE 20 MG/1
1 CAPSULE, DELAYED RELEASE ORAL EVERY 24 HOURS
COMMUNITY
Start: 2010-06-22 | End: 2019-12-31 | Stop reason: ALTCHOICE

## 2019-12-31 NOTE — ASSESSMENT & PLAN NOTE
Discussed preconception considerations at length  She denies fertility risk factors for self or partner  Recommended maintaining healthy BMI and reviewed diet/activity recommendations  Advised daily PNV to be started at least 3 mos prior to actively trying to conceive for prevention of ONTD and prevention of morning sickness  Reviewed teratogen avoidance, timed intercourse, reasons to call  The patient agrees with plan

## 2019-12-31 NOTE — ASSESSMENT & PLAN NOTE
Discussed potential causes of abn uterine bleeding  UPT is neg  Recommended pelvic US and TSH, then provera 10x10 to stabilize endo lining and potentially reset cycles  Recommended diet/exercise interventions for weight loss and discussed metabolic implications of obesity on cycle  When normal cycles resume, continue OPK use and timed intercourse  F/u if AUB continues and will pursue additional labs

## 2019-12-31 NOTE — PROGRESS NOTES
Assessment/Plan:    Abnormal bleeding in menstrual cycle  Discussed potential causes of abn uterine bleeding  UPT is neg  Recommended pelvic US and TSH, then provera 10x10 to stabilize endo lining and potentially reset cycles  Recommended diet/exercise interventions for weight loss and discussed metabolic implications of obesity on cycle  When normal cycles resume, continue OPK use and timed intercourse  F/u if AUB continues and will pursue additional labs  Encounter for preconception consultation  Discussed preconception considerations at length  She denies fertility risk factors for self or partner  Recommended maintaining healthy BMI and reviewed diet/activity recommendations  Advised daily PNV to be started at least 3 mos prior to actively trying to conceive for prevention of ONTD and prevention of morning sickness  Reviewed teratogen avoidance, timed intercourse, reasons to call  The patient agrees with plan  25 mins of time was devoted to this visit, of which >50% was spent counseling face to face re: irreg bleeding and TTC  Diagnoses and all orders for this visit:    Abnormal bleeding in menstrual cycle  -     POCT urine HCG  -     TSH, 3rd generation; Future  -     US pelvis complete w transvaginal; Future  -     medroxyPROGESTERone (PROVERA) 10 mg tablet; Take 1 tablet (10 mg total) by mouth daily    Encounter for preconception consultation    Other orders  -     Discontinue: omeprazole (PriLOSEC) 20 mg delayed release capsule; 1 capsule every 24 hours          Subjective:      Patient ID: Juan Pablo Keith is a 26 y o  female  This patient presents to discuss abn uterine bleeding   H/o ~17 week demise -> D&E in 1/2019  Neg APLS and normal karyotyping     That was first preg and was conceived on 1st month of trying to conceive   No risk factors for infertility for patient or spouse   Menses have been historically reg   She has gained about 20 lbs since IUFD - she reports not exercising and diet has been poor  She would like to lose weight     2/26/19: menses resumed   3/3/19: pos OPK  3/16/19: menses (normal, duration x7d)  4/13/19: pos OPK  4/22/19: menses  Did not check OPK in April - she was feeling a bit obsessed over tracking cycles and wanted to relax  May and June - no menses and did not check OPK    7/24/19: menses - lasted 2 weeks   August - no menses   9/25/19: menses - lasted over 3 weeks   Oct - no menses   11/29/19: menses - heavy then light and still spotting up to today     She denies acute gyn complaints otherwise   Relationship is good       The following portions of the patient's history were reviewed and updated as appropriate: allergies, current medications, past family history, past medical history, past social history, past surgical history and problem list     Review of Systems   Constitutional: Negative  Respiratory: Negative  Cardiovascular: Negative  Gastrointestinal: Negative  Genitourinary: Positive for menstrual problem and vaginal bleeding  Negative for dyspareunia, dysuria, flank pain, pelvic pain and vaginal discharge  Musculoskeletal: Negative  Skin: Negative  Neurological: Negative  Psychiatric/Behavioral: Negative  Objective:      /86   Wt 101 kg (222 lb 6 4 oz)   LMP 11/29/2019 (Exact Date)   BMI 37 01 kg/m²          Physical Exam   Constitutional: She is oriented to person, place, and time  She appears well-developed and well-nourished  BMI 37   HENT:   Head: Normocephalic  Eyes: Pupils are equal, round, and reactive to light  Neck: Normal range of motion  Pulmonary/Chest: Effort normal    Neurological: She is alert and oriented to person, place, and time  Psychiatric: She has a normal mood and affect   Her behavior is normal  Judgment and thought content normal

## 2020-01-03 ENCOUNTER — TELEPHONE (OUTPATIENT)
Dept: OBGYN CLINIC | Facility: CLINIC | Age: 30
End: 2020-01-03

## 2020-01-03 ENCOUNTER — APPOINTMENT (OUTPATIENT)
Dept: LAB | Facility: CLINIC | Age: 30
End: 2020-01-03
Payer: COMMERCIAL

## 2020-01-03 DIAGNOSIS — N93.9 ABNORMAL BLEEDING IN MENSTRUAL CYCLE: ICD-10-CM

## 2020-01-03 DIAGNOSIS — E03.8 SUBCLINICAL HYPOTHYROIDISM: Primary | ICD-10-CM

## 2020-01-03 LAB — TSH SERPL DL<=0.05 MIU/L-ACNC: 2.72 UIU/ML (ref 0.36–3.74)

## 2020-01-03 PROCEDURE — 36415 COLL VENOUS BLD VENIPUNCTURE: CPT

## 2020-01-03 PROCEDURE — 84443 ASSAY THYROID STIM HORMONE: CPT

## 2020-01-03 RX ORDER — LEVOTHYROXINE SODIUM 0.03 MG/1
25 TABLET ORAL DAILY
Qty: 60 TABLET | Refills: 0 | Status: SHIPPED | OUTPATIENT
Start: 2020-01-03 | End: 2021-06-24 | Stop reason: HOSPADM

## 2020-01-03 NOTE — TELEPHONE ENCOUNTER
----- Message from Kathleen Benton, [address] Caryn Osorio sent at 1/3/2020  3:22 PM EST -----  Please notify patient that TSH results are technically normal, but elevated given that she is trying to conceive  Target TSH level is 2 5 or less if actively trying to conceive or pregnant in the 1st trimester   Please advise starting synthroid 25mcg/day and rechecking TSH in about 6 weeks - orders entered   I will be in touch when ultrasound results are back for review

## 2020-01-03 NOTE — TELEPHONE ENCOUNTER
Spoke with Pt today via phone call  Pt informed that her recent TSH results are technically normal but elevated due to Pt trying to conceive, target TSH level is 2 5 or less if actively trying to conceive or pregnant in the 1st trimester per [facility]  Pt advised to start taking Synthroid 25 mcg 1 tablet by mouth daily and rechecking TSH in about 6 weeks (Lab order mailed to Pt's mailing address)  Pt informed that [facility] will contact her when ultrasound results are back for review  Reiterated to Pt that if she has any questions/concerns to contact office

## 2020-01-06 ENCOUNTER — HOSPITAL ENCOUNTER (OUTPATIENT)
Dept: RADIOLOGY | Facility: HOSPITAL | Age: 30
Discharge: HOME | End: 2020-01-06
Payer: COMMERCIAL

## 2020-01-06 DIAGNOSIS — N93.9 ABNORMAL BLEEDING IN MENSTRUAL CYCLE: ICD-10-CM

## 2020-01-06 PROCEDURE — 76830 TRANSVAGINAL US NON-OB: CPT

## 2020-01-06 PROCEDURE — 76856 US EXAM PELVIC COMPLETE: CPT

## 2020-01-09 ENCOUNTER — TELEPHONE (OUTPATIENT)
Dept: OBGYN CLINIC | Facility: CLINIC | Age: 30
End: 2020-01-09

## 2020-01-09 DIAGNOSIS — Q51.810 ARCUATE UTERUS: Primary | ICD-10-CM

## 2020-01-09 DIAGNOSIS — N83.201 RIGHT OVARIAN CYST: ICD-10-CM

## 2020-01-09 NOTE — TELEPHONE ENCOUNTER
----- Message from Aniceto AvilaChirag sent at 1/9/2020  1:04 PM EST -----  Pelvic US was performed for irreg periods and preliminary fertility work up  I called the patient to discuss results but got voicemail  Asked that she calls back for more info - ok to review these recommendations when she calls:   - uterine shape appears to be arcuate, meaning the top of the uterus (imagine upside-down triangle) dips down in the middle  Not overtly concerning however I would recommend that she has HSG for further evaluation, as this may potentially impact future preg  Order entered for HSG and she should call Justin Nunez at our office on day 1 of cycle to schedule this  - there is a cyst on the right ovary, measuring 3 2 cm in largest diameter  This has benign features of hemorrhagic cyst  Likely to resolve spontaneously however I would recommend that we repeat US in 8 weeks to confirm stability or resolution  Please review torsion precautions   Order entered for this US   - left ovary and other images on this study are normal

## 2020-01-13 ENCOUNTER — TELEPHONE (OUTPATIENT)
Dept: OBGYN CLINIC | Facility: CLINIC | Age: 30
End: 2020-01-13

## 2020-01-13 NOTE — TELEPHONE ENCOUNTER
Pt returned call but I was not in available( lunch)  I returned pt call, and advised as directed  Pt was grateful for all the information as I answered all her question and concerns  Pt will call joan as advised when she is on day one and she will call today to schedule the 8 wk follow up us

## 2020-01-16 ENCOUNTER — TELEPHONE (OUTPATIENT)
Dept: OBGYN CLINIC | Facility: CLINIC | Age: 30
End: 2020-01-16

## 2020-01-16 NOTE — TELEPHONE ENCOUNTER
Left message for Ag Lombardi that a cycle can start 2-10 days after medication and the hope is taht 28-30 days later she would get another period as we want to restart her normal menstrual cycles  Please call back if she has any more questions

## 2020-01-16 NOTE — TELEPHONE ENCOUNTER
Patient was given medication by Pricila Brown on 1/3/2020 to help restart her period  Patient barrington she is having spotting and wanted to know if this is too soon for her period to return or if this is the medicine working  Please advise

## 2020-01-20 ENCOUNTER — TELEPHONE (OUTPATIENT)
Dept: OBGYN CLINIC | Facility: CLINIC | Age: 30
End: 2020-01-20

## 2020-01-20 ENCOUNTER — HOSPITAL ENCOUNTER (EMERGENCY)
Facility: HOSPITAL | Age: 30
Discharge: HOME | End: 2020-01-20
Attending: EMERGENCY MEDICINE | Admitting: EMERGENCY MEDICINE
Payer: COMMERCIAL

## 2020-01-20 VITALS
DIASTOLIC BLOOD PRESSURE: 78 MMHG | HEIGHT: 66 IN | TEMPERATURE: 98.1 F | WEIGHT: 222 LBS | OXYGEN SATURATION: 99 % | RESPIRATION RATE: 18 BRPM | HEART RATE: 77 BPM | BODY MASS INDEX: 35.68 KG/M2 | SYSTOLIC BLOOD PRESSURE: 160 MMHG

## 2020-01-20 DIAGNOSIS — N92.1 MENORRHAGIA WITH IRREGULAR CYCLE: Primary | ICD-10-CM

## 2020-01-20 DIAGNOSIS — N93.9 EPISODE OF HEAVY VAGINAL BLEEDING: Primary | ICD-10-CM

## 2020-01-20 LAB
BASOPHILS # BLD AUTO: 0.04 THOUSANDS/ΜL (ref 0–0.1)
BASOPHILS NFR BLD AUTO: 1 % (ref 0–1)
EOSINOPHIL # BLD AUTO: 0.28 THOUSAND/ΜL (ref 0–0.61)
EOSINOPHIL NFR BLD AUTO: 4 % (ref 0–6)
ERYTHROCYTE [DISTWIDTH] IN BLOOD BY AUTOMATED COUNT: 12.2 % (ref 11.6–15.1)
EXT PREG TEST URINE: NEGATIVE
EXT. CONTROL ED NAV: NORMAL
HCT VFR BLD AUTO: 37.5 % (ref 34.8–46.1)
HGB BLD-MCNC: 12.4 G/DL (ref 11.5–15.4)
IMM GRANULOCYTES # BLD AUTO: 0.02 THOUSAND/UL (ref 0–0.2)
IMM GRANULOCYTES NFR BLD AUTO: 0 % (ref 0–2)
LYMPHOCYTES # BLD AUTO: 2.43 THOUSANDS/ΜL (ref 0.6–4.47)
LYMPHOCYTES NFR BLD AUTO: 32 % (ref 14–44)
MCH RBC QN AUTO: 30 PG (ref 26.8–34.3)
MCHC RBC AUTO-ENTMCNC: 33.1 G/DL (ref 31.4–37.4)
MCV RBC AUTO: 91 FL (ref 82–98)
MONOCYTES # BLD AUTO: 0.47 THOUSAND/ΜL (ref 0.17–1.22)
MONOCYTES NFR BLD AUTO: 6 % (ref 4–12)
NEUTROPHILS # BLD AUTO: 4.48 THOUSANDS/ΜL (ref 1.85–7.62)
NEUTS SEG NFR BLD AUTO: 57 % (ref 43–75)
NRBC BLD AUTO-RTO: 0 /100 WBCS
PLATELET # BLD AUTO: 223 THOUSANDS/UL (ref 149–390)
PMV BLD AUTO: 9.6 FL (ref 8.9–12.7)
RBC # BLD AUTO: 4.13 MILLION/UL (ref 3.81–5.12)
WBC # BLD AUTO: 7.72 THOUSAND/UL (ref 4.31–10.16)

## 2020-01-20 PROCEDURE — 81025 URINE PREGNANCY TEST: CPT | Performed by: EMERGENCY MEDICINE

## 2020-01-20 PROCEDURE — 36415 COLL VENOUS BLD VENIPUNCTURE: CPT | Performed by: EMERGENCY MEDICINE

## 2020-01-20 PROCEDURE — 99284 EMERGENCY DEPT VISIT MOD MDM: CPT

## 2020-01-20 PROCEDURE — 85025 COMPLETE CBC W/AUTO DIFF WBC: CPT | Performed by: EMERGENCY MEDICINE

## 2020-01-20 PROCEDURE — 99283 EMERGENCY DEPT VISIT LOW MDM: CPT | Performed by: EMERGENCY MEDICINE

## 2020-01-20 PROCEDURE — 96365 THER/PROPH/DIAG IV INF INIT: CPT

## 2020-01-20 RX ORDER — TRANEXAMIC ACID 650 1/1
1300 TABLET ORAL 3 TIMES DAILY
Qty: 30 TABLET | Refills: 0 | Status: SHIPPED | OUTPATIENT
Start: 2020-01-20 | End: 2020-01-25

## 2020-01-20 RX ADMIN — TRANEXAMIC ACID 1000 MG: 1 INJECTION, SOLUTION INTRAVENOUS at 13:36

## 2020-01-20 NOTE — TELEPHONE ENCOUNTER
Pt called office today c/o heavy vaginal bleeding  Pt previously saw Johanna Kahn on 12/31/19  Pt can be reached @ [phone]

## 2020-01-20 NOTE — TELEPHONE ENCOUNTER
Spoke with Pt today via phone call  Pt states "she is currently experiencing heavy vaginal bleeding at this time  Pt further states she has been consistently soaking through 1 sanitary pad every hour at this time  Pt denies passing clots at this time  Pt states "she has a slight headache "  Pt instructed to report directly to nearest ED for evaluation of symptoms  Pt further states she will report to [facility] ED  ADT 21 referral completed in [facility]

## 2020-01-20 NOTE — ED PROVIDER NOTES
History  Chief Complaint   Patient presents with    Vaginal Bleeding     Pt  reports her period started Thursday and late Saturday night it became super heavy  Pt  denies large clots  Pt has been having irregular periods and has been given hormones by her OBGYN to restart her period  Patient states she has a cyst on her right ovary  59-year-old female with a history of irregular menses presenting emergency department with heavy vaginal bleeding for the last 4 days  She reports that last November she had a menstrual cycle that lasted a month and required evaluation by gyn and eventually was started on progesterone for the bleeding stopped  She has been on the progesterone and tele week and a half ago when she ran out of the prescription and discontinue the medication  At the same time she was started on levothyroxine for suspected subacute hypothyroidism  She does continue this medication today  Four days ago when she started with vaginal bleeding she reports it was similar to previous menstrual cycles with mild bleeding and cramping, however over the last several days the bleeding has increased  She reports that today she saturating 1 pad per hour  Mild pelvic fullness without any severe pains  No urinary complaints  No diarrhea, constipation, or blood in her stool  Denies any vaginal trauma  Denies any lightheadedness, shortness of breath, palpitations, or syncope  No history of coagulopathy  Reports that she is currently trying to become pregnant  Vaginal Bleeding   Associated symptoms: no abdominal pain, no dysuria, no fever and no nausea        Prior to Admission Medications   Prescriptions Last Dose Informant Patient Reported?  Taking?   levothyroxine 25 mcg tablet   No No   Sig: Take 1 tablet (25 mcg total) by mouth daily   medroxyPROGESTERone (PROVERA) 10 mg tablet   No No   Sig: Take 1 tablet (10 mg total) by mouth daily      Facility-Administered Medications: None       Past Medical History:   Diagnosis Date    IUFD at less than 20 weeks of gestation     Urinary tract infection     hx of in the past    Varicella     hx of childhood chickenpox       Past Surgical History:   Procedure Laterality Date    DILATION AND CURETTAGE OF UTERUS      NO PAST SURGERIES      [place] SURG RX MISSED ABORTN,1ST TRI N/A 1/3/2019    Procedure: DILATATION AND EVACUATION (D&E) (16 week fetal demise) ultra soung guidance;  Surgeon: Tino Chavez MD;  Location[facility];  Service: Gynecology       Family History   Problem Relation Age of Onset    Diabetes Mother     Hypertension Mother     Breast cancer Mother     Diabetes Maternal Grandmother     Hypertension Maternal Grandmother     Kidney disease Maternal Grandmother     Cancer Maternal Grandfather      I have reviewed and agree with the history as documented  Social History     Tobacco Use    Smoking status: Never Smoker    Smokeless tobacco: Never Used   Substance Use Topics    Alcohol use: No    Drug use: No        Review of Systems   Constitutional: Negative for chills and fever  HENT: Negative for congestion and sore throat  Eyes: Negative for visual disturbance  Respiratory: Negative for cough and shortness of breath  Cardiovascular: Negative for chest pain and palpitations  Gastrointestinal: Negative for abdominal pain, diarrhea, nausea and vomiting  Genitourinary: Positive for vaginal bleeding  Negative for difficulty urinating and dysuria  Musculoskeletal: Negative for myalgias  Skin: Negative for rash  Neurological: Negative for weakness, light-headedness, numbness and headaches  Hematological: Does not bruise/bleed easily         Physical Exam  ED Triage Vitals [01/20/20 1126]   Temperature Pulse Respirations Blood Pressure SpO2   98 1 °F (36 7 °C) 77 18 160/78 99 %      Temp Source Heart Rate Source Patient Position - Orthostatic VS BP Location FiO2 (%)   Oral Monitor Sitting Right arm --      Pain Score       4             Orthostatic Vital Signs  Vitals:    01/20/20 1126   BP: 160/78   Pulse: 77   Patient Position - Orthostatic VS: Sitting       Physical Exam   Constitutional: She is oriented to person, place, and time  She appears well-developed and well-nourished  No distress  HENT:   Head: Normocephalic and atraumatic  Eyes: Pupils are equal, round, and reactive to light  EOM are normal    Neck: Normal range of motion  Neck supple  Cardiovascular: Normal rate, regular rhythm and normal heart sounds  Pulmonary/Chest: Effort normal and breath sounds normal  No respiratory distress  Abdominal: Soft  Bowel sounds are normal  There is no tenderness  Genitourinary:   Genitourinary Comments: Pooling of blood in the vaginal vault on the speculum with large clot removed from the cervix  Mild bleeding from the cervix actively  No lesions on the vaginal wall  Musculoskeletal: Normal range of motion  Neurological: She is alert and oriented to person, place, and time  Skin: Skin is warm and dry  Psychiatric: She has a normal mood and affect  Nursing note and vitals reviewed        ED Medications  Medications   tranexamic Acid 1,000 mg in sodium chloride 0 9 % 100 mL IVPB Loading Dose (0 mg Intravenous Stopped 1/20/20 1408)       Diagnostic Studies  Results Reviewed     Procedure Component Value Units Date/Time    CBC and differential [[id number]] Collected:  01/20/20 1213    Lab Status:  Final result Specimen:  Blood from Arm, Left Updated:  01/20/20 1231     WBC 7 72 Thousand/uL      RBC 4 13 Million/uL      Hemoglobin 12 4 g/dL      Hematocrit 37 5 %      MCV 91 fL      MCH 30 0 pg      MCHC 33 1 g/dL      RDW 12 2 %      MPV 9 6 fL      Platelets 993 Thousands/uL      nRBC 0 /100 WBCs      Neutrophils Relative 57 %      Immat GRANS % 0 %      Lymphocytes Relative 32 %      Monocytes Relative 6 %      Eosinophils Relative 4 %      Basophils Relative 1 %      Neutrophils Absolute 4 48 Thousands/µL      Immature Grans Absolute 0 02 Thousand/uL      Lymphocytes Absolute 2 43 Thousands/µL      Monocytes Absolute 0 47 Thousand/µL      Eosinophils Absolute 0 28 Thousand/µL      Basophils Absolute 0 04 Thousands/µL     POCT pregnancy, urine [[id number]]  (Normal) Resulted:  01/20/20 1218    Lab Status:  Final result Updated:  01/20/20 1218     EXT PREG TEST UR (Ref: Negative) Negative     Control Valid                 No orders to display         Procedures  Procedures      ED Course  ED Course as of Jan 21 0704   Mon Jan 20, 2020   1154 Recent hx of menorrhagia [id number], followed by GYN, recently diagnosed with hypothyroid, started on levothyroxine and progesterone which initially stopped bleeding  Returned 4 days ago days ago (one day after stopping the progesterone), referred in today because she called with 1 pad/hour bleeding  MDM  Number of Diagnoses or Management Options  Menorrhagia with irregular cycle:   Diagnosis management comments: 62-year-old female presenting emergency department for evaluation of heavy vaginal bleeding  Currently being evaluated by OBGYN while she is trying to become pregnant  History of irregular periods the past   No signs or symptoms of acute blood loss anemia  Hemoglobin stable today  Pregnancy test negative  No abdominal tenderness on exam   Bleeding could be related to hormonal imbalance verses withdrawal bleeding from stopping her gesture on  Will start her on a course of DEXA today and have her follow-up with her OBGYN  Strict return precautions for any symptoms of anemia or worsening bleeding          Disposition  Final diagnoses:   Menorrhagia with irregular cycle     Time reflects when diagnosis was documented in both MDM as applicable and the Disposition within this note     Time User Action Codes Description Comment    1/20/2020 12:33 PM Kiran Ellington Add [N92 1] Menorrhagia with irregular cycle       ED Disposition ED Disposition Condition Date/Time Comment    Discharge Stable Mon Jan 20, 2020 12:37 PM Jeffrey Juan discharge to home/self care  Follow-up Information     Follow up With Specialties Details Why Contact Chirag aCsillas Obstetrics and Gynecology, Nurse Practitioner, Obstetrics, Gynecology Schedule an appointment as soon as possible for a visit in 3 days  [address] [place] [address]  [phone]            Discharge Medication List as of 1/20/2020 12:38 PM      START taking these medications    Details   Tranexamic Acid 650 MG TABS Take 2 tablets by mouth 3 (three) times a day for 5 days, Starting Mon 1/20/2020, Until Sat 1/25/2020, Print         CONTINUE these medications which have NOT CHANGED    Details   levothyroxine 25 mcg tablet Take 1 tablet (25 mcg total) by mouth daily, Starting Fri 1/3/2020, Normal      medroxyPROGESTERone (PROVERA) 10 mg tablet Take 1 tablet (10 mg total) by mouth daily, Starting Tue 12/31/2019, Normal           No discharge procedures on file  ED Provider  Attending physically available and evaluated Jeffrey Martinez I managed the patient along with the ED Attending      Electronically Signed by         Apurva Lopez MD  01/21/20 3804

## 2020-01-20 NOTE — ED ATTENDING ATTESTATION
Bola Freire MD, saw and evaluated the patient  All available labs and X-rays were ordered by me or the resident and have been reviewed by myself  I discussed the patient with the resident / non-physician and agree with the resident's / non-physician practitioner's findings and plan as documented in the resident's / non-physician practicitioner's note, except where noted  At this point, I agree with the current assessment done in the ED  I was present during key portions of all procedures performed unless otherwise stated  Chief Complaint   Patient presents with    Vaginal Bleeding     Pt  reports her period started Thursday and late Saturday night it became super heavy  Pt  denies large clots  Pt has been having irregular periods and has been given hormones by her OBGYN to restart her period  Patient states she has a cyst on her right ovary  This is a 76-year-old female presenting for evaluation of vaginal bleeding  The patient states that she has been having irregular heavy menstrual cycles  In November she had a menstrual cycle every single day  She was placed on Synthroid and medroxyprogesterone  She states that she has been compliant with these medications  She just finished up the medroxyprogesterone on Wednesday, 5 days ago  That evening she started get very light menstrual cycle again  On Thursday it was back to heavy menstrual bleeding  She is passing large clots  She then came in today for evaluation because she feels her bleeding is not well controlled  She is going through pad 1 every 2 hours  No fevers chills nausea vomiting chest pain shortness of breath lightheadedness dizziness falls injuries  No palpitations  No blood thinners including aspirin Plavix Coumadin Xarelto  No history of bleeding from anywhere  No arm pain jaw pain back pain    PE:  Vitals:    01/20/20 1121 01/20/20 1126   BP:  160/78   BP Location:  Right arm   Pulse:  77   Resp:  18   Temp:  98 1 °F (36 7 °C)   TempSrc:  Oral   SpO2:  99%   Weight: 54 9 kg (121 lb) 101 kg (222 lb)   Height:  5' 6" (1 676 m)   General: VSS, NAD, awake, alert  Well-nourished, well-developed  Appears stated age  Speaking normally in full sentences  Head: Normocephalic, atraumatic, nontender  Eyes: PERRL, EOM-I  No diplopia  No hyphema  No subconjunctival hemorrhages  Symmetrical lids  ENT: Atraumatic external nose and ears  MMM  No malocclusion  No stridor  Normal phonation  No drooling  Normal swallowing  Neck: Symmetric, trachea midline  No JVD  CV: RRR  +S1/S2  No murmurs or gallops  Peripheral pulses +2 throughout  No chest wall tenderness  Lungs:   Unlabored No retractions  CTAB, lungs sounds equal bilateral    No tachypnea  Abd: +BS, soft, NT/ND    MSK:   FROM   Back:   No rashes  Skin: Dry, intact  Neuro: AAOx3, GCS 15, CN II-XII grossly intact  Motor grossly intact  Psychiatric/Behavioral: Appropriate mood and affect   Exam: per resident, bleeding (moderate)  A:  - Vaginal bleeding  P:  - likely AUB/hormonally related  - CBC for hemoglobin  - Discussed TXA  - 13 point ROS was performed and all are normal unless stated in the history above  - Nursing note reviewed  Vitals reviewed  - Orders placed by myself and/or advanced practitioner / resident     - Previous chart was reviewed  - No language barrier    - History obtained from patient  - There are no limitations to the history obtained  - Critical care time: Not applicable for this patient  Non-Pregnant Vaginal Bleeding:    PALM-COEIN as per 2011 FIGO (international Federation of GYN and OBS) and ACOG supported       PALM (structural causes)   Polyp   Adenomyosis   Leiomyoma   Malignancy   Hyperplasia  COIEN (non-structural causes)   Coagulopathy   Ovulatory dysfunction   Endometrial   Iatrogenic   Not yet classified    Menorrhagia = heavy menstrual bleeding  Merorrhagia = inter-menstrual bleeding    Anovulatory bleeding: irregular periods and unpredictable bleeding  Ovulatory bleeding: heavy/prolonged menstrual periods    By age:  12-18y   Immaturity of hypothalamic-pituitary-ovarian axis leading to persistent anovulation  Other causes include pelvic infection and coagulopathy (up to 20% of girls presenting to ED with vaginal bleeding may have von Willebrand disease)  19-39y   Structural lesions such as polyps and fibroids  Fibroid is most common pelvic tumor; 20-50% incidence in reproductive age women with incidence increasing with age  Bleeding occurs from disruption of submucosal blood supply surrounding fibroid, often during menstruation  PCOS should be considered in pts with anovulatory bleeding and signs of androgen excess: acne, hirsutism, alopecia, and obesity  >40y   Endometrial atrophy is most common cause, however 10% diagnosed with endometrial carcinoma  Etiology is cancer until proven otherwise  Management of stable patients:  Workup: pregnancy test and CBC  Coagulation studies if patient has symptoms of easy bruising or bleeding, or if taking anticoagulant(s)  Imaging: pelvic ultrasound looking for structural causes such as polyps, fibroids, AV malformation, and endometrial thickness to assess for carcinoma  Anovulatory bleeding is most effectively treated with hormonal treatment  Ovulatory bleeding is treated with non-hormonal treatments  Management of unstable patients:  1  Fluids / blood products  2  Conjugated estrogen  25 mg IV q4-6h  3  Tamponade:   Intrauterine tamponade with 26 Fr deleon infused with 30 mL of saline or balloon for postpartum hemorrhage e g  Bakri balloon  Tamponade of os with pediatric deleon  Vaginal tamponade with gauze, using long strips for easier removal   4  Surgery    Notes:  - To estimate volume of blood; clenched fist is about 500 mL / 50 cm diameter floor spill is also about 500 mL        Code Status: No Order  Advance Directive and Living Will:      Power of :    POLST:      Final Diagnosis:  1   Menorrhagia with irregular cycle      ED Course as of Jan 20 1446   Mon Jan 20, 2020   1341 Hemoglobin: 12 4     Medications   tranexamic Acid 1,000 mg in sodium chloride 0 9 % 100 mL IVPB Loading Dose (0 mg Intravenous Stopped 1/20/20 1408)     No orders to display     Orders Placed This Encounter   Procedures    CBC and differential    POCT pregnancy, urine     Labs Reviewed   POCT PREGNANCY, URINE - Normal       Result Value Ref Range Status    EXT PREG TEST UR (Ref: Negative) Negative   Final    Control Valid   Final   CBC AND DIFFERENTIAL    WBC 7 72  4 31 - 10 16 Thousand/uL Final    RBC 4 13  3 81 - 5 12 Million/uL Final    Hemoglobin 12 4  11 5 - 15 4 g/dL Final    Hematocrit 37 5  34 8 - 46 1 % Final    MCV 91  82 - 98 fL Final    MCH 30 0  26 8 - 34 3 pg Final    MCHC 33 1  31 4 - 37 4 g/dL Final    RDW 12 2  11 6 - 15 1 % Final    MPV 9 6  8 9 - 12 7 fL Final    Platelets 250  949 - 390 Thousands/uL Final    nRBC 0  /100 WBCs Final    Neutrophils Relative 57  43 - 75 % Final    Immat GRANS % 0  0 - 2 % Final    Lymphocytes Relative 32  14 - 44 % Final    Monocytes Relative 6  4 - 12 % Final    Eosinophils Relative 4  0 - 6 % Final    Basophils Relative 1  0 - 1 % Final    Neutrophils Absolute 4 48  1 85 - 7 62 Thousands/µL Final    Immature Grans Absolute 0 02  0 00 - 0 20 Thousand/uL Final    Lymphocytes Absolute 2 43  0 60 - 4 47 Thousands/µL Final    Monocytes Absolute 0 47  0 17 - 1 22 Thousand/µL Final    Eosinophils Absolute 0 28  0 00 - 0 61 Thousand/µL Final    Basophils Absolute 0 04  0 00 - 0 10 Thousands/µL Final     Time reflects when diagnosis was documented in both MDM as applicable and the Disposition within this note     Time User Action Codes Description Comment    1/20/2020 12:33 PM Jeral Stamp Add [N92 1] Menorrhagia with irregular cycle       ED Disposition     ED Disposition Condition Date/Time Comment    Discharge Stable Mon Jan 20, 2020 12:37 PM Jeffrey Fresh discharge to home/self care  Follow-up Information     Follow up With Specialties Details Why Contact Info    Shirley Blas [address] [facility], Nurse Practitioner, Obstetrics, Gynecology Schedule an appointment as soon as possible for a visit in 3 days  [address] [place] [address]  [phone]          Discharge Medication List as of 1/20/2020 12:38 PM      START taking these medications    Details   Tranexamic Acid 650 MG TABS Take 2 tablets by mouth 3 (three) times a day for 5 days, Starting Mon 1/20/2020, Until Sat 1/25/2020, Print         CONTINUE these medications which have NOT CHANGED    Details   levothyroxine 25 mcg tablet Take 1 tablet (25 mcg total) by mouth daily, Starting Fri 1/3/2020, Normal      medroxyPROGESTERone (PROVERA) 10 mg tablet Take 1 tablet (10 mg total) by mouth daily, Starting Tue 12/31/2019, Normal           No discharge procedures on file  Prior to Admission Medications   Prescriptions Last Dose Informant Patient Reported? Taking?   levothyroxine 25 mcg tablet   No No   Sig: Take 1 tablet (25 mcg total) by mouth daily   medroxyPROGESTERone (PROVERA) 10 mg tablet   No No   Sig: Take 1 tablet (10 mg total) by mouth daily      Facility-Administered Medications: None       Portions of the record may have been created with voice recognition software  Occasional wrong word or "sound a like" substitutions may have occurred due to the inherent limitations of voice recognition software  Read the chart carefully and recognize, using context, where substitutions have occurred      Electronically signed by:  Marvin Olson

## 2020-02-14 ENCOUNTER — APPOINTMENT (OUTPATIENT)
Dept: LAB | Facility: CLINIC | Age: 30
End: 2020-02-14
Payer: COMMERCIAL

## 2020-02-14 DIAGNOSIS — E03.8 SUBCLINICAL HYPOTHYROIDISM: ICD-10-CM

## 2020-02-14 LAB — TSH SERPL DL<=0.05 MIU/L-ACNC: 1.41 UIU/ML (ref 0.36–3.74)

## 2020-02-14 PROCEDURE — 84443 ASSAY THYROID STIM HORMONE: CPT

## 2020-02-14 PROCEDURE — 36415 COLL VENOUS BLD VENIPUNCTURE: CPT

## 2020-03-09 ENCOUNTER — HOSPITAL ENCOUNTER (OUTPATIENT)
Dept: RADIOLOGY | Facility: HOSPITAL | Age: 30
Discharge: HOME | End: 2020-03-09
Payer: COMMERCIAL

## 2020-03-09 DIAGNOSIS — N83.201 RIGHT OVARIAN CYST: ICD-10-CM

## 2020-03-09 PROCEDURE — 76856 US EXAM PELVIC COMPLETE: CPT

## 2020-03-09 PROCEDURE — 76830 TRANSVAGINAL US NON-OB: CPT

## 2020-03-17 ENCOUNTER — TELEPHONE (OUTPATIENT)
Dept: OBGYN CLINIC | Facility: CLINIC | Age: 30
End: 2020-03-17

## 2020-03-17 NOTE — TELEPHONE ENCOUNTER
----- Message from Anurag Dutta, [address] sent at 3/16/2020  4:52 PM EDT -----  Please schedule an office visit with the patient to review results and discuss plan

## 2020-03-19 ENCOUNTER — OFFICE VISIT (OUTPATIENT)
Dept: OBGYN CLINIC | Facility: CLINIC | Age: 30
End: 2020-03-19
Payer: COMMERCIAL

## 2020-03-19 VITALS — BODY MASS INDEX: 34.86 KG/M2 | SYSTOLIC BLOOD PRESSURE: 112 MMHG | WEIGHT: 216 LBS | DIASTOLIC BLOOD PRESSURE: 70 MMHG

## 2020-03-19 DIAGNOSIS — N83.201 RIGHT OVARIAN CYST: ICD-10-CM

## 2020-03-19 DIAGNOSIS — Q51.818 MULLERIAN ANOMALY OF UTERUS: Primary | ICD-10-CM

## 2020-03-19 DIAGNOSIS — N93.9 ABNORMAL BLEEDING IN MENSTRUAL CYCLE: ICD-10-CM

## 2020-03-19 PROCEDURE — 99214 OFFICE O/P EST MOD 30 MIN: CPT | Performed by: NURSE PRACTITIONER

## 2020-03-19 NOTE — ASSESSMENT & PLAN NOTE
This has decreased in size since last ultrasound  Reassurance provided that this presents with benign features  Reviewed sx of torsion and reasons to call

## 2020-03-19 NOTE — ASSESSMENT & PLAN NOTE
H/o AUB and irreg cycles since 2/2019  Considering actively trying to conceive in the near future  See additional discussion under mullerian anomaly subheading  Recommended continuing to observe and track timing, duration and features  Advised calling for prolonged bleeding

## 2020-03-19 NOTE — ASSESSMENT & PLAN NOTE
Uterine contours on recent ultrasounds have been described as having arcuate (2020) and bicornuate (3/2020) cavitary shapes  Discussed concept of mullerian anomalies at length  Recommended HSG to ollie further  Discussed potential implications of mullerian anomaly as it pertains to Mell's ability to achieve and maintain pregnancy  Advised that bicornuate uterus is associated with increased risks of spontaneous ,  birth, IUGR and malpresentation in labor  Advised that arcuate uterus is considered a normal variant and is not thought to present fertility or obstetrical complications  Given h/o prior unexplained 17 week IUFD and her plans to try to conceive in the near future (as well as AUB in the last year) I offered the opportunity to do this through an RE&I practice rather than through our office  At this time she plans to contact her insurance co re: fertility testing and treatment coverage, and she would like to see a fertility specialist  She was provided contact info for Jonathan, Dr Poli Canales and Josr Orantes (formerly known as Eliud)  Ag Lombardi was encouraged to call if she desires to have [facility] perform HSG instead  If bicornuate uterus is ultimately diagnosed, would recommend renal ultrasound to rule out anomaly

## 2020-03-19 NOTE — PROGRESS NOTES
Assessment/Plan:    Right ovarian cyst  This has decreased in size since last ultrasound  Reassurance provided that this presents with benign features  Reviewed sx of torsion and reasons to call  Abnormal bleeding in menstrual cycle  H/o AUB and irreg cycles since 2019  Considering actively trying to conceive in the near future  See additional discussion under mullerian anomaly subheading  Recommended continuing to observe and track timing, duration and features  Advised calling for prolonged bleeding  Mullerian anomaly of uterus  Uterine contours on recent ultrasounds have been described as having arcuate (2020) and bicornuate (3/2020) cavitary shapes  Discussed concept of mullerian anomalies at length  Recommended HSG to eval further  Discussed potential implications of mullerian anomaly as it pertains to Mell's ability to achieve and maintain pregnancy  Advised that bicornuate uterus is associated with increased risks of spontaneous ,  birth, IUGR and malpresentation in labor  Advised that arcuate uterus is considered a normal variant and is not thought to present fertility or obstetrical complications  Given h/o prior unexplained 17 week IUFD and her plans to try to conceive in the near future (as well as AUB in the last year) I offered the opportunity to do this through an RE&I practice rather than through our office  At this time she plans to contact her insurance co re: fertility testing and treatment coverage, and she would like to see a fertility specialist  She was provided contact info for Jonathan, Dr Lincoln Nash and Inna Duong (formerly known as Eliud)  Shaista Oliva was encouraged to call if she desires to have [facility] perform HSG instead  If bicornuate uterus is ultimately diagnosed, would recommend renal ultrasound to rule out anomaly       25 mins of time was devoted to this visit, of which >50% was spent counseling face to face re: mullerian anomaly, aub and hemorrhagic ovarian cyst         Diagnoses and all orders for this visit:    Mullerian anomaly of uterus    Right ovarian cyst    Abnormal bleeding in menstrual cycle          Subjective:      Patient ID: Marylin Johnson is a 26 y o  female  This patient presents to review pelvic US results and abn bleeding patterns     1/2020 pelvic US with arcuate uterus and right hemorrhagic cyst measuring 3 2cm in largest diameter  3/2020 pelvic US with bicornuate uterus and right hemorrhagic cyst measuring 2 8cm in largest diameter  Bleeding patterns are as follows:    1/2019: ~17 week demise -> D&E  Normal karyotyping, neg antiphospholipid antibody testing    2/26/19: menses resumed and they were actively TTC  3/3/19: pos OPK  3/16/19: menses (normal, duration x7d)  4/13/19: pos OPK  4/22/19: menses  Did not check OPK in April - she was feeling a bit obsessed over tracking cycles and wanted to relax  May and June - no menses and did not check OPK    7/24/19: menses - lasted 2 weeks   August - no menses   9/25/19: menses - lasted over 3 weeks   Oct - no menses   11/29/19: menses - heavy then light, duration of about 7 days   Started synthroid for subclinical hypothyroidism around 1/3/20 - 6wk recheck was improved  1/16/2020: bleeding following Provera 10x10 -> very heavy on 1/20/20 and was evaluated in the ED  Bleeding stopped with Lysteda  2/14/20: spont period, duration 7 days   3/12/20: LMP  Still bleeding today  Georgia Patrick reports she and her  are planning to TTC in the next few months   She denies acute gyn complaints at this time              The following portions of the patient's history were reviewed and updated as appropriate: allergies, current medications, past family history, past medical history, past social history, past surgical history and problem list     Review of Systems   Constitutional: Negative  Respiratory: Negative  Cardiovascular: Negative  Gastrointestinal: Negative  Genitourinary: Positive for menstrual problem  Negative for dysuria, frequency, pelvic pain and vaginal discharge  Musculoskeletal: Negative  Skin: Negative  Neurological: Negative  Psychiatric/Behavioral: Negative  Objective:      /70   Wt 98 kg (216 lb)   LMP 03/13/2020   BMI 34 86 kg/m²          Physical Exam   Constitutional: She is oriented to person, place, and time  She appears well-developed and well-nourished  HENT:   Head: Normocephalic  Eyes: Pupils are equal, round, and reactive to light  Neck: Normal range of motion  Pulmonary/Chest: Effort normal    Neurological: She is alert and oriented to person, place, and time  Psychiatric: She has a normal mood and affect   Her behavior is normal  Judgment and thought content normal

## 2021-04-30 ENCOUNTER — ULTRASOUND (OUTPATIENT)
Dept: OBGYN CLINIC | Facility: CLINIC | Age: 31
End: 2021-04-30
Payer: COMMERCIAL

## 2021-04-30 VITALS — WEIGHT: 195.6 LBS | SYSTOLIC BLOOD PRESSURE: 112 MMHG | BODY MASS INDEX: 31.57 KG/M2 | DIASTOLIC BLOOD PRESSURE: 66 MMHG

## 2021-04-30 DIAGNOSIS — O09.291 HISTORY OF PREGNANCY LOSS IN PRIOR PREGNANCY, CURRENTLY PREGNANT IN FIRST TRIMESTER: ICD-10-CM

## 2021-04-30 DIAGNOSIS — N91.1 SECONDARY AMENORRHEA: Primary | ICD-10-CM

## 2021-04-30 PROCEDURE — 99214 OFFICE O/P EST MOD 30 MIN: CPT | Performed by: OBSTETRICS & GYNECOLOGY

## 2021-04-30 PROCEDURE — 76817 TRANSVAGINAL US OBSTETRIC: CPT | Performed by: OBSTETRICS & GYNECOLOGY

## 2021-04-30 NOTE — PROGRESS NOTES
Assessment/Plan:    History of pregnancy loss in prior pregnancy, currently pregnant in first trimester  -     Ambulatory Referral to Maternal Fetal Medicine; Future     @ 8 weeks 1 day  - repeat sono 2 weeks    Subjective      Nils Crowell is a 27 y o   LMP 2021 who presents with amenorrhea and positive urine hCG  She denies any VB  Some nausea  She saw MARY re:  Potential Mullerian anomaly of uterus  Evaluation normal; trying to get records  Cycle length: regular only recently, but h/o mostly irregular cycles  Pregnancy testing: at home  Pregnancy imaging: not done  Blood type: B positive  Other lab results: none  The following portions of the patient's history were reviewed and updated as appropriate: allergies, current medications, past family history, past medical history, past social history, past surgical history and problem list     Review of Systems  Pertinent items are noted in HPI  Objective      /66 (BP Location: Left arm, Patient Position: Sitting, Cuff Size: Large)   Wt 88 7 kg (195 lb 9 6 oz)   LMP 2021 (Exact Date)   BMI 31 57 kg/m²     General: alert and oriented, in no acute distress   Heart: regular rate and rhythm, S1, S2 normal, no murmur, click, rub or gallop   Lungs: clear to auscultation bilaterally   Abdomen: soft, non-tender, without masses or organomegaly   Vulva: normal       AMB US Pelvic Non OB    Date/Time: 2021 11:02 AM  Performed by: Marveen Curling, MD  Authorized by: Marveen Curling, MD   Universal Protocol:  Consent: Verbal consent obtained  Risks and benefits: risks, benefits and alternatives were discussed  Consent given by: patient  Time out: Immediately prior to procedure a "time out" was called to verify the correct patient, procedure, equipment, support staff and site/side marked as required    Patient understanding: patient states understanding of the procedure being performed  Patient identity confirmed: verbally with patient      Procedure details:     Indications comment:  Amenorrhea, + urine hCG    Technique:  Transvaginal US, Non-OB    Position: lithotomy exam    Uterine findings:     Adnexal mass: not identified      Myomas: not identified    Cervix findings:      closed  Left ovary findings:     Left ovary:  Visualized    Cysts: not identified    Right ovary findings:     Right ovary:  Visualized    Cysts: not identified    Other findings:     Free pelvic fluid: not identified    Post-Procedure Details:     Impression:  Single viable intrauterine gestation CRL c/w 8w1d (not c/w LMP)  + yolk sac  FHM 167bpm     Tolerance:   Tolerated well, no immediate complications

## 2021-05-14 ENCOUNTER — ULTRASOUND (OUTPATIENT)
Dept: OBGYN CLINIC | Facility: CLINIC | Age: 31
End: 2021-05-14
Payer: COMMERCIAL

## 2021-05-14 VITALS — DIASTOLIC BLOOD PRESSURE: 64 MMHG | BODY MASS INDEX: 31.41 KG/M2 | SYSTOLIC BLOOD PRESSURE: 122 MMHG | WEIGHT: 194.6 LBS

## 2021-05-14 DIAGNOSIS — N91.2 AMENORRHEA: Primary | ICD-10-CM

## 2021-05-14 PROCEDURE — 76817 TRANSVAGINAL US OBSTETRIC: CPT | Performed by: OBSTETRICS & GYNECOLOGY

## 2021-05-14 NOTE — PROGRESS NOTES
AMB US Pelvic Non OB    Date/Time: 5/14/2021 9:42 AM  Performed by: Kae Hein MD  Authorized by: Kae Hein MD   Universal Protocol:  Consent: Verbal consent obtained  Consent given by: patient  Time out: Immediately prior to procedure a "time out" was called to verify the correct patient, procedure, equipment, support staff and site/side marked as required  Patient understanding: patient states understanding of the procedure being performed  Patient identity confirmed: verbally with patient      Procedure details:     Indications comment:  Amenorrhea, + urine hCG, dates no c/w measurements    Technique:  Transvaginal US, Non-OB    Position: lithotomy exam    Uterine findings:     Adnexal mass: not identified      Myomas: not identified    Cervix findings:      Appears closed  Left ovary findings:     Left ovary:  Not visualized    Cysts: not identified    Right ovary findings:     Right ovary:  Not visualized    Cysts: not identified    Other findings:     Free pelvic fluid: not identified    Post-Procedure Details:     Impression:  Single viable intrauterine gestation CRL 3 40 cm c/w EGA of 10w 1d by previous sono  + FHM      [facility] 12/9/2021

## 2021-05-19 ENCOUNTER — TELEMEDICINE (OUTPATIENT)
Dept: OBGYN CLINIC | Facility: CLINIC | Age: 31
End: 2021-05-19

## 2021-05-19 VITALS — HEIGHT: 67 IN | BODY MASS INDEX: 30.45 KG/M2 | WEIGHT: 194 LBS

## 2021-05-19 DIAGNOSIS — Z34.81 PRENATAL CARE, SUBSEQUENT PREGNANCY, FIRST TRIMESTER: Primary | ICD-10-CM

## 2021-05-19 PROCEDURE — OBC: Performed by: OBSTETRICS & GYNECOLOGY

## 2021-05-19 NOTE — PATIENT INSTRUCTIONS
El embarazo de la semana 11 a la 14   LO QUE NECESITA SABER:   Ahora usted está al término del primer trimestre y entrando al duke trimestre  Los [employer] matutinos por lo general desaparecen para [employer]  Es posible que usted tenga otros síntomas mariola fatiga, orinar con frecuencia y arminda de [place]  Usted podría candelaria aumentado de 2 a 4 libras hasta ahora  INSTRUCCIONES SOBRE [facility]:   Regrese a la amarilis de emergencias si:  · Usted tiene dolor o cólicos en el abdomen o la parte baja de la espalda  · Usted tiene sangrado vaginal abundante o coágulos  · Le sale un material que parece tejido o coágulos grandes  Recolecte el material y tráigalo con usted  Llame a milian médico u obstetra si:  · Usted no puede retener alimentos ni líquidos y está perdiendo [place]  · Usted tiene un sangrado leve  · Usted tiene escalofríos o fiebre  · Usted tiene comezón, ardor o dolor vaginal     · Usted tiene clyde secreción vaginal amarillenta, verdosa, rodney o de [employer]  · Usted tiene dolor o ardor al Sherran Curls, orina menos de lo habitual o tiene [place] rosada o sanguinolenta  · Usted tiene preguntas o inquietudes acerca de milian condición o cuidado  Cómo cuidarse en esta etapa de milian embarazo:  · Descanse lo suficiente  Es posible que usted se sienta más cansada de lo normal  Usted podría necesitar kristin siestas o acostarse más temprano  · Controle la náusea y el vómito  Evite los alimentos grasosos y picantes  Coma comidas pequeñas bj el día en vez de porciones grandes  El jengibre puede ayudar a [employer]  Consulte con milian médico acerca de otras formas para disminuir las náuseas y el vómito  · Consuma alimentos saludables y variados  Alimentos saludables incluyen frutas, verduras, panes de yelitza integral, alimentos lácteos bajos en grasa, frijoles, deonte magras y pescado  [place] líquidos mariola se le haya indicado   Pregunte cuánto líquido debe kristin cada día y cuáles líquidos son los más adecuados para usted  Limite el consumo de cafeína a menos de [address]  Limite el consumo de pescado a 2 porciones cada semana  Escoja pescado con concentraciones bajas de alton mariola atún al natural enlatado, camarón, salmón, bacalao o tilapia  No coma pescado con concentraciones altas de alton mariola pez meredith, caballa gigante, pargo rayado y tiburón  · [address] [place]  Ellis necesidad de ciertas vitaminas y [facility], mariola el ácido fólico, aumenta bj el [facility]  Las vitaminas prenatales proporcionan algunas de las vitaminas y minerales adicionales que usted necesita  Las vitaminas prenatales también podrían ayudar a disminuir el riesgo de ciertos defectos de nacimiento  · No fume  Fumar aumenta el riesgo de aborto espontáneo y otros problemas de ce bj ellis [facility]  Fumar puede causar que ellis bebé nazca antes de tiempo o que pese menos al nacer  Solicite información a ellis médico si usted necesita ayuda para dejar de fumar  · No consuma alcohol  El alcohol pasa de ellis cuerpo al bebé a través de la placenta  Puede afectar el desarrollo del cerebro de ellis bebé y provocar el síndrome de alcoholismo fetal (SAF)  El SAF es un caroline de condiciones que causan problemas [place], de comportamiento y de crecimiento  · Consulte con ellis médico antes de kristin cualquier medicamento  Muchos medicamentos pueden perjudicar a ellis bebé si usted los davi [address] [place] [address]  No tome ningún medicamento, vitaminas, hierbas o suplementos sin galen consultar con ellis Juluis Ng  use drogas ilegales o de la patterson (mariola marihuana o cocaína) mientras está embarazada  Consejos de seguridad bj el embarazo:  · Evite jacuzzis y saunas  No use un jacuzzi o un sauna mientras usted está embarazada, especialmente bj el primer trimestre   Los [employer] y los saunas aumentan la temperatura de ellis bebé y el riesgo de defectos de nacimiento  · Evite la toxoplasmosis  Verdigris es cindy infección causada por comer carne cruda o estar cerca del excremento de un clair infectado  Verdigris puede causar malformaciones congénitas, aborto espontáneo y Wesly Schein  Lávese las emely después de tocar carne cruda  Asegúrese de que la carne esté mauricio cocida antes de comerla  Evite los huevos crudos y la Lorenzo Alvine  Use guantes o pida que alguien la ayude a limpiar la caja de arena del clair mientras usted Edavaleria Mckay  Cambios que ocurren con milian bebé: Milian bebé tiene completamente formadas las uñas de miguelina emely y pies  Ahora milian latido se puede escuchar  Pregunte a milian médico si usted puede escuchar el latido cardíaco de milian bebé  Para la semana 14, milian bebé mide más de 4 pulgadas desde la punta de la leonidas hasta la rabadilla (parte inferior del bebé)  Milian bebé pesa más de 3 onzas  Atención prenatal: El cuidado prenatal se trata de cindy serie de visitas con milian médico a lo hai del embarazo  Sanket las primeras 28 semanas de milian [place], usted tendrá citas con milian médico cindy vez al mes  El cuidado prenatal puede ayudar a evitar problemas sanket Sravani Alexandre  Milian médico le revisará milian presión arterial y [place]  También se controlará la frecuencia cardíaca de milian bebé  Es posible que usted también necesite lo siguiente en algunas de miguelina citas:  · Un examen pélvico le permite a milian médico observar milian arnaldo uterino (la parte inferior de milian útero)  Milian médico usará un espéculo para abrir la vagina  [address] [place] tamaño y la forma de milian Valentine Sascha  · Los análisis de otoniel podrían realizarse para buscar si hay signos de lo siguiente:     ? Diabetes gestacional o anemia (bajo nivel de yusef)    ? Tipo de otoniel o factor Rh, o ciertos defectos congénitos    ? Inmunidad a ciertas enfermedades, mariola la varicela o la rubéola    ?  Cindy infección, mariola cindy infección de transmisión sexual, VIH o hepatitis B    · La hepatitis B puede necesitar prevención o tratamiento  La hepatitis B es la inflamación del hígado causada por el virus de la hepatitis B (VHB)  El VHB puede transmitirse de Son a milian bebé bj el parto  Se le hará clyde prueba de detección del VHB lo antes posible bj el primer trimestre de cada embarazo  Usted necesita la prueba incluso si recibió la vacuna contra la hepatitis B o si se la hicieron antes  Es posible que necesite que le traten clyde infección por el VHB antes de tavo a tricia  · Análisis de orina también podría realizarse para revisarle el azúcar y la proteína  Estas son señales de diabetes gestacional o preeclampsia  También podrían realizarle análisis de orina para revisar si hay signos de infección  · [place] ecografía del feto [place] imágenes del bebé dentro de milian Nettie Leland  Las imágenes se utilizan para verificar el desarrollo, el movimiento y la posición del bebé  · Se le pueden ofrecer pruebas de detección de trastornos genéticos  Estos exámenes revisan el riesgo de milian bebé de trastornos genéticos mariola el síndrome de Down  Un examen de detección incluye análisis de otoniel y un ultrasonido  Acuda a miguelina consultas de control con milian médico u obstetra según le indicaron: Andrey Sermons a todas las visitas de control prenatal  Anote miguelina preguntas para que se acuerde de hacerlas bj miguelina visitas  © Copyright [address] Information is for End User's use only and may not be sold, redistributed or otherwise used for commercial purposes  All illustrations and images included in CareNotes® are the copyrighted property of A [employer] A [employer] , [employer]  or [address] [place] [address] es sólo para uso en educación  Milian intención no es darle un consejo médico sobre enfermedades o tratamientos  Colsulte con milian Gaylyn Harsh farmacéutico antes de seguir cualquier régimen médico para saber si es seguro y efectivo para usted

## 2021-05-19 NOTE — PROGRESS NOTES
OB INTAKE INTERVIEW  Patient is 27y o y o  year old who presents for OB intake at 8w1d wks  She is accompanied by: Telephone intake  The father of her baby Joel Alexanedr) and is  involved in the pregnancy and is 28years old    Last Menstrual Period: 21  Ultrasound: Measured 8w weeks 1 days on 21  Estimated Date of Delivery: 21confirmed by Epi Santacruz    Signs/Symptoms of Pregnancy  Current pregnancy symptoms: Mild nausea, resolving  Constipation no  Headaches no  Cramping/spotting no  PICA cravings no    Diabetes-  Body mass index is 30 38 kg/m²  If patient has 1 or more, please order early 1 hour GTT  History of GDM no  BMI >35 no  History of PCOS or current metformin use no  History of LGA/macrosomic infant (4000g/9lbs) no    If patient has 2 or more, please order early 1 hour GTT  BMI>30 YES  AMA no  First degree relative with type 2 diabetes YES  History of chronic HTN, hyperlipidemia, elevated A1C no  High risk race (, , ,  or ) no    Hypertension- if you answer yes, please order preeclampsia labs (comprehensive metabolic panel, urine protein creatinine ratio, 24 hour urine)  History of of chronic HTN no  History of gestational HTN no  History of preeclampsia, eclampsia, or HELLP syndrome no  History of diabetes no  History of lupus, autoimmune disease, kidney disease no    Thyroid- if yes order TSH with reflex T4  History of thyroid disease no    Bleeding Disorder or Hx of DVT-patient or first degree relative with history of  Order the following if not done previously     (Factor V, antithrombin III, prothrombin gene mutation, protein C and S Ag, lupus anticoagulant, anticardiolipin, beta-2 glycoprotein)   no    OB/GYN-  History of abnormal pap smear no   Last pap 18 : WNL  History of HPV no  History of Herpes/HSV no  History of other STI (gonorrhea, chlamydia, trich) no  History of prior  no  History of prior  no  History of  delivery prior to 36 weeks 6 days no  History of blood transfusion no  Ok for blood transfusion Yes    Substance screening- if yes outside of tobacco for her or anyone in her home-order urine drug screen  History of tobacco use no  Currently using tobacco no  Currently using alcohol no  Presently using drugs no  Past drug use  no  IV drug use-If yes add Hep C antibody to labs no  Partner drug use no  Parent/Family drug use no    MRSA Screening-   Does the pt have a hx of MRSA? no  If yes- please follow MRSA protocol and obtain a nasal swab for MRSA culture    Immunizations:  Influenza vaccine given this season No  Discussed Tdap vaccine Yes  Discussed COVID Vaccine Yes  Undecided  Would like to speak with provider further at next visit  Genetic/MFM-  Do you or your partner have a history of any of the following in yourselves or first degree relatives? Cystic fibrosis no  Spinal muscular atrophy no  Hemoglobinopathy/Sickle Cell/Thalassemia no  Fragile X Intellectual Disability no    If yes, discuss carrier screening and recommend consultation with MFM/genetic counseling  If no, discuss option for carrier screening and/or genetic testing with Nuchal Ultrasound  Patient interested Yes  Appointment at [facility] made Scheduled    Interview education  [facility] Pregnancy Essentials Book reviewed and discussed Yes  [facility] org/Women's Health/Obstetrics/Pregnancy guide (PDF)  Pregnancy 11 to 14 weeks included in AVS     Nurse/Family Partnership- patient may qualify No; referral placed No    Prenatal lab work scripts   Extra labs ordered:  1 hour Glucola    The patient has a history now or in prior pregnancy notable for: Fetal demise at 16 weeks, 2018        Details that I feel the provider should be aware of: Elevated BMI, Family hx of Diabetes Mellitus Type II, hx of fetal demise at 12 weeks  Pregnancy planned and welcomed  Patient lives at home with her  Jignesh Franklin   Works for [employer], does not typically lift greater than 35 lbs  Reviewed pregnancy symptoms  PN1 visit scheduled  The patient was oriented to our practice, reviewed delivering physicians and [facility] for Delivery  Prenatal labs ordered to include 1 hr G;ucose due ot BMI and family history  Appointment scheduled for MFM  Patient undecided about Covid Vaccine, will discuss with provider at next visit  Patient is anxious as she had a fetal demise at 12 weeks  For this reason she has scheduled more frequent visits  All questions were answered      Interviewed by: Hasmukh Gallegos

## 2021-05-26 ENCOUNTER — APPOINTMENT (OUTPATIENT)
Dept: LAB | Facility: CLINIC | Age: 31
End: 2021-05-26
Payer: COMMERCIAL

## 2021-05-26 ENCOUNTER — TRANSCRIBE ORDERS (OUTPATIENT)
Dept: LAB | Facility: CLINIC | Age: 31
End: 2021-05-26

## 2021-05-26 ENCOUNTER — TELEPHONE (OUTPATIENT)
Dept: OBGYN CLINIC | Facility: CLINIC | Age: 31
End: 2021-05-26

## 2021-05-26 DIAGNOSIS — O16.1 ELEVATED BLOOD PRESSURE AFFECTING PREGNANCY IN FIRST TRIMESTER, ANTEPARTUM: ICD-10-CM

## 2021-05-26 DIAGNOSIS — Z34.81 PRENATAL CARE, SUBSEQUENT PREGNANCY, FIRST TRIMESTER: ICD-10-CM

## 2021-05-26 LAB
ABO GROUP BLD: NORMAL
BACTERIA UR QL AUTO: ABNORMAL /HPF
BASOPHILS # BLD AUTO: 0.04 THOUSANDS/ΜL (ref 0–0.1)
BASOPHILS NFR BLD AUTO: 1 % (ref 0–1)
BILIRUB UR QL STRIP: NEGATIVE
BLD GP AB SCN SERPL QL: NEGATIVE
CLARITY UR: CLEAR
COLOR UR: YELLOW
EOSINOPHIL # BLD AUTO: 0.3 THOUSAND/ΜL (ref 0–0.61)
EOSINOPHIL NFR BLD AUTO: 4 % (ref 0–6)
ERYTHROCYTE [DISTWIDTH] IN BLOOD BY AUTOMATED COUNT: 12.5 % (ref 11.6–15.1)
GLUCOSE 1H P 50 G GLC PO SERPL-MCNC: 131 MG/DL (ref 40–134)
GLUCOSE UR STRIP-MCNC: NEGATIVE MG/DL
HBV SURFACE AG SER QL: NORMAL
HCT VFR BLD AUTO: 41.6 % (ref 34.8–46.1)
HGB BLD-MCNC: 13.8 G/DL (ref 11.5–15.4)
HGB UR QL STRIP.AUTO: NEGATIVE
HYALINE CASTS #/AREA URNS LPF: ABNORMAL /LPF
IMM GRANULOCYTES # BLD AUTO: 0.02 THOUSAND/UL (ref 0–0.2)
IMM GRANULOCYTES NFR BLD AUTO: 0 % (ref 0–2)
KETONES UR STRIP-MCNC: ABNORMAL MG/DL
LEUKOCYTE ESTERASE UR QL STRIP: NEGATIVE
LYMPHOCYTES # BLD AUTO: 1.71 THOUSANDS/ΜL (ref 0.6–4.47)
LYMPHOCYTES NFR BLD AUTO: 21 % (ref 14–44)
MCH RBC QN AUTO: 29.8 PG (ref 26.8–34.3)
MCHC RBC AUTO-ENTMCNC: 33.2 G/DL (ref 31.4–37.4)
MCV RBC AUTO: 90 FL (ref 82–98)
MONOCYTES # BLD AUTO: 0.37 THOUSAND/ΜL (ref 0.17–1.22)
MONOCYTES NFR BLD AUTO: 5 % (ref 4–12)
NEUTROPHILS # BLD AUTO: 5.57 THOUSANDS/ΜL (ref 1.85–7.62)
NEUTS SEG NFR BLD AUTO: 69 % (ref 43–75)
NITRITE UR QL STRIP: NEGATIVE
NON-SQ EPI CELLS URNS QL MICRO: ABNORMAL /HPF
NRBC BLD AUTO-RTO: 0 /100 WBCS
PH UR STRIP.AUTO: 6.5 [PH]
PLATELET # BLD AUTO: 246 THOUSANDS/UL (ref 149–390)
PMV BLD AUTO: 9.2 FL (ref 8.9–12.7)
PROT UR STRIP-MCNC: NEGATIVE MG/DL
RBC # BLD AUTO: 4.63 MILLION/UL (ref 3.81–5.12)
RBC #/AREA URNS AUTO: ABNORMAL /HPF
RH BLD: POSITIVE
RPR SER QL: NORMAL
RUBV IGG SERPL IA-ACNC: 36 IU/ML
SP GR UR STRIP.AUTO: 1.02 (ref 1–1.03)
UROBILINOGEN UR QL STRIP.AUTO: 0.2 E.U./DL
WBC # BLD AUTO: 8.01 THOUSAND/UL (ref 4.31–10.16)
WBC #/AREA URNS AUTO: ABNORMAL /HPF

## 2021-05-26 PROCEDURE — 80053 COMPREHEN METABOLIC PANEL: CPT

## 2021-05-26 PROCEDURE — 80081 OBSTETRIC PANEL INC HIV TSTG: CPT

## 2021-05-26 PROCEDURE — 81001 URINALYSIS AUTO W/SCOPE: CPT

## 2021-05-26 PROCEDURE — 82950 GLUCOSE TEST: CPT

## 2021-05-26 PROCEDURE — 87086 URINE CULTURE/COLONY COUNT: CPT

## 2021-05-26 PROCEDURE — 36415 COLL VENOUS BLD VENIPUNCTURE: CPT

## 2021-05-27 ENCOUNTER — TELEPHONE (OUTPATIENT)
Dept: OBGYN CLINIC | Facility: CLINIC | Age: 31
End: 2021-05-27

## 2021-05-27 ENCOUNTER — INITIAL PRENATAL (OUTPATIENT)
Dept: OBGYN CLINIC | Facility: CLINIC | Age: 31
End: 2021-05-27

## 2021-05-27 VITALS — DIASTOLIC BLOOD PRESSURE: 80 MMHG | SYSTOLIC BLOOD PRESSURE: 142 MMHG | BODY MASS INDEX: 30.6 KG/M2 | WEIGHT: 195.4 LBS

## 2021-05-27 DIAGNOSIS — O16.1 ELEVATED BLOOD PRESSURE AFFECTING PREGNANCY IN FIRST TRIMESTER, ANTEPARTUM: Primary | ICD-10-CM

## 2021-05-27 DIAGNOSIS — Z34.81 PRENATAL CARE, SUBSEQUENT PREGNANCY, FIRST TRIMESTER: Primary | ICD-10-CM

## 2021-05-27 DIAGNOSIS — O09.292 HISTORY OF PREGNANCY LOSS IN PRIOR PREGNANCY, CURRENTLY PREGNANT IN SECOND TRIMESTER: ICD-10-CM

## 2021-05-27 PROBLEM — N93.9 ABNORMAL BLEEDING IN MENSTRUAL CYCLE: Status: RESOLVED | Noted: 2019-12-31 | Resolved: 2021-05-27

## 2021-05-27 PROBLEM — Z31.69 ENCOUNTER FOR PRECONCEPTION CONSULTATION: Status: RESOLVED | Noted: 2019-12-31 | Resolved: 2021-05-27

## 2021-05-27 PROBLEM — N92.6 ABNORMAL BLEEDING IN MENSTRUAL CYCLE: Status: RESOLVED | Noted: 2019-12-31 | Resolved: 2021-05-27

## 2021-05-27 LAB
ALBUMIN SERPL BCP-MCNC: 3.5 G/DL (ref 3.5–5)
ALP SERPL-CCNC: 49 U/L (ref 46–116)
ALT SERPL W P-5'-P-CCNC: 17 U/L (ref 12–78)
ANION GAP SERPL CALCULATED.3IONS-SCNC: 7 MMOL/L (ref 4–13)
AST SERPL W P-5'-P-CCNC: 11 U/L (ref 5–45)
BACTERIA UR CULT: NORMAL
BILIRUB SERPL-MCNC: 0.54 MG/DL (ref 0.2–1)
BUN SERPL-MCNC: 9 MG/DL (ref 5–25)
CALCIUM SERPL-MCNC: 9.3 MG/DL (ref 8.3–10.1)
CHLORIDE SERPL-SCNC: 107 MMOL/L (ref 100–108)
CO2 SERPL-SCNC: 25 MMOL/L (ref 21–32)
CREAT SERPL-MCNC: 0.62 MG/DL (ref 0.6–1.3)
GFR SERPL CREATININE-BSD FRML MDRD: 120 ML/MIN/1.73SQ M
GLUCOSE SERPL-MCNC: 121 MG/DL (ref 65–140)
HIV 1+2 AB+HIV1 P24 AG SERPL QL IA: NORMAL
POTASSIUM SERPL-SCNC: 4 MMOL/L (ref 3.5–5.3)
PROT SERPL-MCNC: 7.5 G/DL (ref 6.4–8.2)
SL AMB  POCT GLUCOSE, UA: NORMAL
SL AMB POCT URINE PROTEIN: NORMAL
SODIUM SERPL-SCNC: 139 MMOL/L (ref 136–145)

## 2021-05-27 PROCEDURE — 87624 HPV HI-RISK TYP POOLED RSLT: CPT | Performed by: STUDENT IN AN ORGANIZED HEALTH CARE EDUCATION/TRAINING PROGRAM

## 2021-05-27 PROCEDURE — PNV: Performed by: STUDENT IN AN ORGANIZED HEALTH CARE EDUCATION/TRAINING PROGRAM

## 2021-05-27 PROCEDURE — G0145 SCR C/V CYTO,THINLAYER,RESCR: HCPCS | Performed by: STUDENT IN AN ORGANIZED HEALTH CARE EDUCATION/TRAINING PROGRAM

## 2021-05-27 PROCEDURE — 87591 N.GONORRHOEAE DNA AMP PROB: CPT | Performed by: STUDENT IN AN ORGANIZED HEALTH CARE EDUCATION/TRAINING PROGRAM

## 2021-05-27 PROCEDURE — 87491 CHLMYD TRACH DNA AMP PROBE: CPT | Performed by: STUDENT IN AN ORGANIZED HEALTH CARE EDUCATION/TRAINING PROGRAM

## 2021-05-27 NOTE — TELEPHONE ENCOUNTER
CMP added  Urine can not  Placed order or UPC  L/M for patient with instructions  Advised to call back with any questions

## 2021-05-27 NOTE — TELEPHONE ENCOUNTER
----- Message from Suyapa Harmon MD sent at 5/27/2021  2:02 PM EDT -----  Can we see if they can add on CMP and UPC to patient's labs from yesterday? BP elevated today at [facility]  If not can we order and let patient know she will need to have them done

## 2021-05-27 NOTE — PROGRESS NOTES
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

## 2021-05-27 NOTE — PROGRESS NOTES
Pt presents for first prenatal visit   Denies any bleeding, cramping, LOF   Last pap 11/29/2018   Urine -/-

## 2021-05-28 ENCOUNTER — TELEPHONE (OUTPATIENT)
Dept: OBGYN CLINIC | Facility: CLINIC | Age: 31
End: 2021-05-28

## 2021-05-28 LAB
C TRACH DNA SPEC QL NAA+PROBE: NEGATIVE
HPV HR 12 DNA CVX QL NAA+PROBE: NEGATIVE
HPV16 DNA CVX QL NAA+PROBE: NEGATIVE
HPV18 DNA CVX QL NAA+PROBE: NEGATIVE
N GONORRHOEA DNA SPEC QL NAA+PROBE: NEGATIVE

## 2021-05-28 NOTE — TELEPHONE ENCOUNTER
spoke to pt and reviewed neg results    ----- Message from Jordana Bonds MD sent at 5/28/2021  1:12 PM EDT -----  Notify all normal

## 2021-06-01 RX ORDER — ASPIRIN 81 MG/1
162 TABLET, CHEWABLE ORAL DAILY
Qty: 180 TABLET | Refills: 1 | Status: CANCELLED | OUTPATIENT
Start: 2021-06-01 | End: 2021-08-30

## 2021-06-01 NOTE — PROGRESS NOTES
Please refer to the [facility] ultrasound report in Ob Procedures for additional information regarding today's visit

## 2021-06-02 ENCOUNTER — ROUTINE PRENATAL (OUTPATIENT)
Dept: PERINATAL CARE | Facility: CLINIC | Age: 31
End: 2021-06-02
Payer: COMMERCIAL

## 2021-06-02 VITALS
DIASTOLIC BLOOD PRESSURE: 68 MMHG | SYSTOLIC BLOOD PRESSURE: 135 MMHG | WEIGHT: 194.8 LBS | HEIGHT: 67 IN | HEART RATE: 77 BPM | BODY MASS INDEX: 30.57 KG/M2

## 2021-06-02 DIAGNOSIS — O09.291 HISTORY OF PREGNANCY LOSS IN PRIOR PREGNANCY, CURRENTLY PREGNANT IN FIRST TRIMESTER: Primary | ICD-10-CM

## 2021-06-02 DIAGNOSIS — O99.211 MATERNAL OBESITY, ANTEPARTUM, FIRST TRIMESTER: ICD-10-CM

## 2021-06-02 DIAGNOSIS — Z3A.12 12 WEEKS GESTATION OF PREGNANCY: ICD-10-CM

## 2021-06-02 DIAGNOSIS — O99.281 HYPOTHYROIDISM IN PREGNANCY, ANTEPARTUM, FIRST TRIMESTER: ICD-10-CM

## 2021-06-02 DIAGNOSIS — E03.9 HYPOTHYROIDISM IN PREGNANCY, ANTEPARTUM, FIRST TRIMESTER: ICD-10-CM

## 2021-06-02 DIAGNOSIS — Z36.82 ENCOUNTER FOR ANTENATAL SCREENING FOR NUCHAL TRANSLUCENCY: ICD-10-CM

## 2021-06-02 LAB
LAB AP GYN PRIMARY INTERPRETATION: NORMAL
Lab: NORMAL

## 2021-06-02 PROCEDURE — 76813 OB US NUCHAL MEAS 1 GEST: CPT | Performed by: OBSTETRICS & GYNECOLOGY

## 2021-06-02 PROCEDURE — 99241 PR OFFICE CONSULTATION NEW/ESTAB PATIENT 15 MIN: CPT | Performed by: OBSTETRICS & GYNECOLOGY

## 2021-06-02 RX ORDER — ASPIRIN 81 MG/1
162 TABLET, CHEWABLE ORAL DAILY
Qty: 180 TABLET | Refills: 1 | Status: SHIPPED | OUTPATIENT
Start: 2021-06-02 | End: 2021-11-11

## 2021-06-02 NOTE — PROGRESS NOTES
Patient chose to have Part 1 Sequential Screening from [employer]  Finger stick specimen collected from right middle finger and patient tolerated procedure well  Sample mailed to [employer] via [employer]  Explained results will be available in 7-10 business days  [facility] office will call her with results or she can view in [address]  [place] collection for Part 2 is between 16-18 weeks gestation, a lab slip and instruction letter will be mailed from our office  Patient instructed to take the paper lab slip to lab, this specialty genetic test is not yet in Epic  Patient verbalized understanding of all instructions

## 2021-06-02 NOTE — LETTER
June 4, 2021     [address]et [address]  [address] [address]    Patient: Regina Garcia   YOB: 1990   Date of Visit: 6/2/2021       Dear Dr Mccracken Round: Thank you for referring Regina Garcia to me for evaluation  Below are my notes for this consultation  If you have questions, please do not hesitate to call me  I look forward to following your patient along with you  Sincerely,        Heriberto Choi MD        CC: No Recipients  Heriberto Choi MD  6/1/2021  7:30 AM  Sign when Signing Visit   Please refer to the [facility] ultrasound report in Ob Procedures for additional information regarding today's visit

## 2021-06-09 ENCOUNTER — TELEPHONE (OUTPATIENT)
Dept: PERINATAL CARE | Facility: CLINIC | Age: 31
End: 2021-06-09

## 2021-06-09 NOTE — LETTER
06/09/21  Brenda Smith  1990    Thank you for completing Part 1 of your Sequential Screen  To obtain a complete test result, complete blood work for Part 2 Sequential Screen between the weeks of 6/28/21 to 7/12/21  Please verify which laboratory is In Network with your insurance plan ([facility] lab or [facility])      If you choose to use a [facility] lab, please go to a location from this list:     [address]  [address] [facility], [facility] [address]                [facility], [facility] [address] [facility]  [address] [facility], [facility] [address]                    [facility], [facility] [address] [facility]  [address], [place], [address] [place] [address]             [address], [address]  [address], [facility] [address]             [address] [facility], [facility] [address]  [address] [facility], [facility] 3              [facility] [id number] [place] [address] Drive [address] [facility]  [address], [facility] [address]                        [address] [facility] [address], [facility] [address] [facility]  [address], [address] [facility] [address]            [address] [facility], [facility] 9[address] [facility]                            [address], [facility] gap [address]    For list of Ruby Martinez [facility] MalpracticeAgents   If you choose [facility], please remind the phlebotomist the screen is ordered for [facility]  You can take this letter with you to the lab  Call Maternal Fetal Medicine nurse line any questions at [phone]     Thank you,  [facility] Maternal Fetal [facility] Staff

## 2021-06-09 NOTE — TELEPHONE ENCOUNTER
----- Message from Warden Eris MD sent at 6/8/2021  2:34 PM EDT -----  I reviewed the lab study today and the results reveal low risks for trisomy 25 and Down syndrome

## 2021-06-09 NOTE — TELEPHONE ENCOUNTER
Left voice mail message at number provided on communication consent  Result of Integrated [facility] Part 1 Sequential Screen given and Part 2 instructions explained  Patient to call MFM nurse line for any questions, number provided  #[phone]  TRF and lab instruction letter mailed

## 2021-06-11 ENCOUNTER — ROUTINE PRENATAL (OUTPATIENT)
Dept: OBGYN CLINIC | Facility: CLINIC | Age: 31
End: 2021-06-11

## 2021-06-11 VITALS — BODY MASS INDEX: 30.23 KG/M2 | WEIGHT: 193 LBS | DIASTOLIC BLOOD PRESSURE: 76 MMHG | SYSTOLIC BLOOD PRESSURE: 122 MMHG

## 2021-06-11 DIAGNOSIS — Z34.82 PRENATAL CARE, SUBSEQUENT PREGNANCY, SECOND TRIMESTER: Primary | ICD-10-CM

## 2021-06-11 LAB
SL AMB  POCT GLUCOSE, UA: NORMAL
SL AMB POCT URINE PROTEIN: NORMAL

## 2021-06-11 PROCEDURE — PNV: Performed by: OBSTETRICS & GYNECOLOGY

## 2021-06-11 NOTE — PROGRESS NOTES
No flutter yet  No VB or cramping  Normal SQS  Has 20 week sono scheduled    RTO 2 weeks (h/o 16 week IUFD)

## 2021-06-17 ENCOUNTER — SOCIAL WORK (OUTPATIENT)
Dept: BEHAVIORAL/MENTAL HEALTH CLINIC | Facility: CLINIC | Age: 31
End: 2021-06-17
Payer: COMMERCIAL

## 2021-06-17 DIAGNOSIS — F41.9 ANXIETY DURING PREGNANCY: Primary | ICD-10-CM

## 2021-06-17 DIAGNOSIS — O99.340 ANXIETY DURING PREGNANCY: Primary | ICD-10-CM

## 2021-06-17 PROCEDURE — 90834 PSYTX W PT 45 MINUTES: CPT | Performed by: SOCIAL WORKER

## 2021-06-17 NOTE — PSYCH
Assessment/Plan:      There are no diagnoses linked to this encounter  Subjective:     Patient ID: Amy Yee is a 27 y o  female  Suzanne Burr lives with Shon Bain,  - together for 10yrs,  4yrs  Works for [employer] - days during the week - 4, 10hr shifts - walking  A lot - in August will be there a yr   works overnights [email or web address] in a different department  Shon Bain gets a month off paternity, paid maternity leave for her as well  Unsure about day care plan for baby  Second pregnancy  First one ended in 16 week loss - no heartbeat at 16 week appt  Did not find out birth of baby - thinks saw that it was boy  Tried for  ayr then had period issues - used meds to stop it then continued - heavy - once for a month long  Ultrasounds on uterus and recommended fertility clinic as potential abnormal uterus - then pandemic hit and did not go - "not serious about trying "    Normal at fertility specialist   Irregular periods but somewhat normal before pregnancy then got ovulation kit and tried during ovulation - pregnant easily  12/28 - no heartbeat - did more testing - did amnio and found no cause - had to decided about induction or D&E  Got elephant tattoo in rememberance of baby  D&E on 1/3 - celebrate that day as birthday the following yr  Got cremated - have an urn in blue velvet box  Project [employer] - edy hats/blankets for 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  Names for this baby - Shaye Heard or Helen Freeman  Told only family this time - due 12/09  Sister is due on the 8th  Sister' has a 7yo  Parents  since she was little - no communication with Dad since 2000 since 9yr - unsure and "I don't care "  Court case, arrest, mom walked in when came home from work  Not seen since  Did counseling  Occurred for yrs, maybe 4yrs    3rd of 3 - sister lives nearby, brother local as well and mom and grandmother live together in [place]  Mom been through a lot - then had breast cancer  Gets along well with family  Irene Calleece has 4 siblings - Dad from [place] and 3 older who were raise din [place] then has sister with his mom as well - he's youngest   Fearful of getting covid vaccine during pregnancy   vaccinated - she and sister may go later in pregnancy  OB doing every two week appts with her  Eating ok, sleeping ok but wakes to urinate a lot  Anxiety related to fear of loss, not connected to pregnancy/afraid to get too excited  She and Irene Niece talk about baby they lost    Due date 11/5 - thought about first yr but felt better this yr  Would have been 1yo now        HPI    Review of Systems      Objective:     Physical Exam  Oriented, engaged, sad/calm, full range affect, good eye contact, appropriate speech, denies suicidal/homicidal ideations/psychosis, good insight/judgement

## 2021-06-24 ENCOUNTER — ROUTINE PRENATAL (OUTPATIENT)
Dept: OBGYN CLINIC | Facility: CLINIC | Age: 31
End: 2021-06-24

## 2021-06-24 VITALS — WEIGHT: 195 LBS | BODY MASS INDEX: 30.54 KG/M2 | SYSTOLIC BLOOD PRESSURE: 128 MMHG | DIASTOLIC BLOOD PRESSURE: 74 MMHG

## 2021-06-24 DIAGNOSIS — O99.340 ANXIETY DURING PREGNANCY: ICD-10-CM

## 2021-06-24 DIAGNOSIS — Z34.82 PRENATAL CARE, SUBSEQUENT PREGNANCY, SECOND TRIMESTER: Primary | ICD-10-CM

## 2021-06-24 DIAGNOSIS — F41.9 ANXIETY DURING PREGNANCY: ICD-10-CM

## 2021-06-24 DIAGNOSIS — Q51.818 MULLERIAN ANOMALY OF UTERUS: ICD-10-CM

## 2021-06-24 LAB
SL AMB  POCT GLUCOSE, UA: NORMAL
SL AMB POCT URINE PROTEIN: NORMAL

## 2021-06-24 PROCEDURE — PNV: Performed by: STUDENT IN AN ORGANIZED HEALTH CARE EDUCATION/TRAINING PROGRAM

## 2021-06-24 NOTE — PROGRESS NOTES
Mullerian anomaly of uterus  No remark on , possible arcuate on her first tri dating US  Anxiety during pregnancy  She has seen Leslie Og  Plans to continue care  Prenatal care, subsequent pregnancy, second trimester  28 yo  at 16+0 feeling okay  Anxious due to prior loss around this time  Cramping yesterday at work that self resolved but made her very nervous  Discussed at length PTL precautions and how to manage symptoms of pregnancy  No bleeding or leaking  Not feeling movement yet  Requests q2wk appt at this time  Anatomy scan scheduled

## 2021-06-24 NOTE — PROGRESS NOTES
Pt presents for routine prenatal visit   Denies any bleeding, LOF   C/o cramping   Pt states she feels little flutters

## 2021-06-24 NOTE — ASSESSMENT & PLAN NOTE
26 yo  at 16+0 feeling okay  Anxious due to prior loss around this time  Cramping yesterday at work that self resolved but made her very nervous  Discussed at length PTL precautions and how to manage symptoms of pregnancy  No bleeding or leaking  Not feeling movement yet  Requests q2wk appt at this time  Anatomy scan scheduled

## 2021-07-01 ENCOUNTER — OFFICE VISIT (OUTPATIENT)
Dept: PERINATAL CARE | Facility: CLINIC | Age: 31
End: 2021-07-01
Payer: COMMERCIAL

## 2021-07-01 VITALS
HEIGHT: 67 IN | WEIGHT: 195.8 LBS | BODY MASS INDEX: 30.73 KG/M2 | SYSTOLIC BLOOD PRESSURE: 122 MMHG | DIASTOLIC BLOOD PRESSURE: 59 MMHG | HEART RATE: 91 BPM

## 2021-07-01 DIAGNOSIS — O09.292 PRIOR MISCARRIAGE WITH PREGNANCY IN SECOND TRIMESTER, ANTEPARTUM: Primary | ICD-10-CM

## 2021-07-01 DIAGNOSIS — Q51.818 MULLERIAN ANOMALY OF UTERUS: ICD-10-CM

## 2021-07-01 DIAGNOSIS — Z3A.17 17 WEEKS GESTATION OF PREGNANCY: ICD-10-CM

## 2021-07-01 PROCEDURE — 76805 OB US >/= 14 WKS SNGL FETUS: CPT | Performed by: OBSTETRICS & GYNECOLOGY

## 2021-07-01 PROCEDURE — 3008F BODY MASS INDEX DOCD: CPT | Performed by: OBSTETRICS & GYNECOLOGY

## 2021-07-01 NOTE — LETTER
July 1, 2021     Solo Chung, [address]    Patient: Huong Jama   YOB: 1990   Date of Visit: 7/1/2021       Dear Dr Byron Estrada: Thank you for referring Huong Jama to me for evaluation  Below are my notes for this consultation  If you have questions, please do not hesitate to call me  I look forward to following your patient along with you  Sincerely,        Pita Jacobson MD        CC: No Recipients  Pita Jacobson MD  7/1/2021 11:20 AM  Sign when Signing Visit  Huong Jama  has no complaints today at 17w0d  She does not report any vaginal bleeding or signs of labor  Her recently completed fetal testing revealed a  Normal sequential screen part 1  She also had a normal Glucola of 131    Problem list:  1  History of a 16 week loss( fetal demise prior to labor)  without etiology found  She is on baby aspirin  2  Prior reported hypothyroidism that resolved  Her TSH in this pregnancy was normal on no medication with a TSH of 1 4 on 2/14/20  3  Possible bicornuate versus arcuate uterus based on a prepregnancy radiology scans 1/6/20 and 3/9/20  I did not see evidence for an HSG to confirm diagnosis in her records  Ultrasound findings: The ultrasound today shows normal interval fetal growth and fluid and no malformations were detected  Today's ultrasound was limited secondary to early gestational age  The placental cord insertion is just at 2 centimeters from the margin of the placenta which is normal   A uterine abnormality is not suggested by today's ultrasound  Pregnancy ultrasound has limitations and is unable to detect all forms of fetal congenital abnormalities  Follow up recommended:   1  Recommend a follow-up ultrasound in 4 weeks for a level 2 ultrasound and transvaginal scan for cervical length  2  She is planning on completing part 2 her sequential screen      Pita Jacobson MD

## 2021-07-01 NOTE — PROGRESS NOTES
Anabela Rosenberg  has no complaints today at 17w0d  She does not report any vaginal bleeding or signs of labor  Her recently completed fetal testing revealed a  Normal sequential screen part 1  She also had a normal Glucola of 131    Problem list:  1  History of a 16 week loss( fetal demise prior to labor)  without etiology found  She is on baby aspirin  2  Prior reported hypothyroidism that resolved  Her TSH in this pregnancy was normal on no medication with a TSH of 1 4 on 2/14/20  3  Possible bicornuate versus arcuate uterus based on a prepregnancy radiology scans 1/6/20 and 3/9/20  I did not see evidence for an HSG to confirm diagnosis in her records  Ultrasound findings: The ultrasound today shows normal interval fetal growth and fluid and no malformations were detected  Today's ultrasound was limited secondary to early gestational age  The placental cord insertion is just at 2 centimeters from the margin of the placenta which is normal   A uterine abnormality is not suggested by today's ultrasound  Pregnancy ultrasound has limitations and is unable to detect all forms of fetal congenital abnormalities  Follow up recommended:   1  Recommend a follow-up ultrasound in 4 weeks for a level 2 ultrasound and transvaginal scan for cervical length  2  She is planning on completing part 2 her sequential screen      Alvaro Sultana MD

## 2021-07-07 ENCOUNTER — APPOINTMENT (OUTPATIENT)
Dept: LAB | Facility: CLINIC | Age: 31
End: 2021-07-07
Payer: COMMERCIAL

## 2021-07-07 ENCOUNTER — IMMUNIZATIONS (OUTPATIENT)
Dept: FAMILY MEDICINE CLINIC | Facility: HOSPITAL | Age: 31
End: 2021-07-07
Payer: COMMERCIAL

## 2021-07-07 DIAGNOSIS — Z33.1 PREGNANT STATE, INCIDENTAL: ICD-10-CM

## 2021-07-07 DIAGNOSIS — Z36.9 UNSPECIFIED ANTENATAL SCREENING: ICD-10-CM

## 2021-07-07 DIAGNOSIS — Z23 ENCOUNTER FOR IMMUNIZATION: Primary | ICD-10-CM

## 2021-07-07 PROCEDURE — 91300 SARS-COV-2 / COVID-19 MRNA VACCINE (PFIZER-BIONTECH) 30 MCG: CPT

## 2021-07-07 PROCEDURE — 36415 COLL VENOUS BLD VENIPUNCTURE: CPT

## 2021-07-07 PROCEDURE — 0001A SARS-COV-2 / COVID-19 MRNA VACCINE (PFIZER-BIONTECH) 30 MCG: CPT

## 2021-07-08 LAB — SCAN RESULT: NORMAL

## 2021-07-09 ENCOUNTER — ROUTINE PRENATAL (OUTPATIENT)
Dept: OBGYN CLINIC | Facility: CLINIC | Age: 31
End: 2021-07-09

## 2021-07-09 VITALS — BODY MASS INDEX: 30.7 KG/M2 | SYSTOLIC BLOOD PRESSURE: 120 MMHG | WEIGHT: 196 LBS | DIASTOLIC BLOOD PRESSURE: 64 MMHG

## 2021-07-09 DIAGNOSIS — Z34.82 MULTIGRAVIDA IN SECOND TRIMESTER: ICD-10-CM

## 2021-07-09 PROBLEM — Q51.818 MULLERIAN ANOMALY OF UTERUS: Status: RESOLVED | Noted: 2020-01-09 | Resolved: 2021-07-09

## 2021-07-09 LAB
SL AMB  POCT GLUCOSE, UA: ABNORMAL
SL AMB POCT URINE PROTEIN: ABNORMAL

## 2021-07-09 PROCEDURE — PNV: Performed by: PHYSICIAN ASSISTANT

## 2021-07-09 NOTE — PROGRESS NOTES
Problem List Items Addressed This Visit        Other    Multigravida in second trimester     27 y o  female here for routine PN visit at 18w1d  Feels well overall  Good fetal movement  Maintained on aspirin 162mg po daily for hx of 16 week demise  Had normal MFM visit on 7/1  Has level 2 scheduled  First OB labs normal, genetics low risk  She works at [employer] as a , sometimes needs to do heavier lifting  Has accomodation form at home - encouraged to bring in so we can go over it in person and decide how to complete this form  Was worried because her sister was told by our  staff today that we will not complete this form - reassured patient that I will absolutely fill out this form with her              Relevant Orders    POCT urine dip (Completed)

## 2021-07-09 NOTE — ASSESSMENT & PLAN NOTE
27 y o  female here for routine PN visit at [facility] overall  Good fetal movement  Maintained on aspirin 162mg po daily for hx of 16 week demise  Had normal MFM visit on 7/1  Has level 2 scheduled  First OB labs normal, genetics low risk  She works at [employer] as a , sometimes needs to do heavier lifting  Has accomodation form at home - encouraged to bring in so we can go over it in person and decide how to complete this form  Was worried because her sister was told by our  staff today that we will not complete this form - reassured patient that I will absolutely fill out this form with her

## 2021-07-12 ENCOUNTER — TELEPHONE (OUTPATIENT)
Dept: PERINATAL CARE | Facility: CLINIC | Age: 31
End: 2021-07-12

## 2021-07-12 NOTE — TELEPHONE ENCOUNTER
I left a message for Kenton Lux to notify her of the result of her sequential screen part 2  Call back number [phone] left for any questions

## 2021-07-12 NOTE — TELEPHONE ENCOUNTER
----- Message from Andria Morgan MD sent at 7/12/2021  8:07 AM EDT -----    I reviewed the lab study today and the results revealed decreased risks for Down syndrome, trisomy 18, and open neural tube defects

## 2021-07-22 ENCOUNTER — ROUTINE PRENATAL (OUTPATIENT)
Dept: OBGYN CLINIC | Facility: CLINIC | Age: 31
End: 2021-07-22

## 2021-07-22 VITALS — SYSTOLIC BLOOD PRESSURE: 136 MMHG | BODY MASS INDEX: 31.32 KG/M2 | DIASTOLIC BLOOD PRESSURE: 62 MMHG | WEIGHT: 200 LBS

## 2021-07-22 DIAGNOSIS — Z34.82 MULTIGRAVIDA IN SECOND TRIMESTER: Primary | ICD-10-CM

## 2021-07-22 LAB
SL AMB  POCT GLUCOSE, UA: NEGATIVE
SL AMB POCT URINE PROTEIN: NEGATIVE

## 2021-07-22 PROCEDURE — PNV: Performed by: OBSTETRICS & GYNECOLOGY

## 2021-07-22 NOTE — PROGRESS NOTES
Prenatal visit at 20 weeks  Denies ctxs, VB, LOF  Can now feel baby move  She has had significant anxiety secondary to her history of a 16 week loss  She is seeing Poly Schaeffer regarding this  Reassured that this is normal in her situation  She works at [employer] - concerned that she will need lifting restrictions as she becomes more pregnant  Discussed this as relates to HR and FMLA  She will contact us if/when she needs this  Level 2 scheduled for 7/29/21 @ 1:00 at [facility]  Patient had her first COVID shot and going for her second on 7/27  All questions answered  Precautions given

## 2021-07-28 ENCOUNTER — IMMUNIZATIONS (OUTPATIENT)
Dept: FAMILY MEDICINE CLINIC | Facility: HOSPITAL | Age: 31
End: 2021-07-28

## 2021-07-28 DIAGNOSIS — Z23 ENCOUNTER FOR IMMUNIZATION: Primary | ICD-10-CM

## 2021-07-28 PROCEDURE — 0002A SARS-COV-2 / COVID-19 MRNA VACCINE (PFIZER-BIONTECH) 30 MCG: CPT

## 2021-07-28 PROCEDURE — 91300 SARS-COV-2 / COVID-19 MRNA VACCINE (PFIZER-BIONTECH) 30 MCG: CPT

## 2021-07-29 ENCOUNTER — ROUTINE PRENATAL (OUTPATIENT)
Dept: PERINATAL CARE | Facility: CLINIC | Age: 31
End: 2021-07-29
Payer: COMMERCIAL

## 2021-07-29 ENCOUNTER — TELEMEDICINE (OUTPATIENT)
Dept: BEHAVIORAL/MENTAL HEALTH CLINIC | Facility: CLINIC | Age: 31
End: 2021-07-29
Payer: COMMERCIAL

## 2021-07-29 VITALS
WEIGHT: 200.8 LBS | DIASTOLIC BLOOD PRESSURE: 62 MMHG | SYSTOLIC BLOOD PRESSURE: 119 MMHG | BODY MASS INDEX: 31.52 KG/M2 | HEIGHT: 67 IN | HEART RATE: 107 BPM

## 2021-07-29 DIAGNOSIS — O99.212 MATERNAL OBESITY, ANTEPARTUM, SECOND TRIMESTER: Primary | ICD-10-CM

## 2021-07-29 DIAGNOSIS — O99.340 ANXIETY DURING PREGNANCY: Primary | ICD-10-CM

## 2021-07-29 DIAGNOSIS — O09.292 PRIOR MISCARRIAGE WITH PREGNANCY IN SECOND TRIMESTER, ANTEPARTUM: ICD-10-CM

## 2021-07-29 DIAGNOSIS — Z36.86 ENCOUNTER FOR ANTENATAL SCREENING FOR CERVICAL LENGTH: ICD-10-CM

## 2021-07-29 DIAGNOSIS — F41.9 ANXIETY DURING PREGNANCY: Primary | ICD-10-CM

## 2021-07-29 DIAGNOSIS — Z3A.21 21 WEEKS GESTATION OF PREGNANCY: ICD-10-CM

## 2021-07-29 PROCEDURE — 76817 TRANSVAGINAL US OBSTETRIC: CPT | Performed by: OBSTETRICS & GYNECOLOGY

## 2021-07-29 PROCEDURE — 99213 OFFICE O/P EST LOW 20 MIN: CPT | Performed by: OBSTETRICS & GYNECOLOGY

## 2021-07-29 PROCEDURE — 76811 OB US DETAILED SNGL FETUS: CPT | Performed by: OBSTETRICS & GYNECOLOGY

## 2021-07-29 PROCEDURE — 90834 PSYTX W PT 45 MINUTES: CPT | Performed by: SOCIAL WORKER

## 2021-07-29 NOTE — PSYCH
Virtual Regular Visit    Verification of patient location:    Patient is located in the following state in which I hold an active license [place]      Assessment/Plan:    Problem List Items Addressed This Visit        Other    Anxiety during pregnancy - Primary          Goals addressed in session: Decrease anxiety         Reason for visit is   Chief Complaint   Patient presents with    Virtual Regular Visit        Encounter provider Carrington Roth    Provider located at [address]      Recent Visits  No visits were found meeting these conditions  Showing recent visits within past 7 days and meeting all other requirements  Today's Visits  Date Type Provider Dept   07/29/21 Telemedicine Carrington Roth [facility]   Showing today's visits and meeting all other requirements  Future Appointments  No visits were found meeting these conditions  Showing future appointments within next 150 days and meeting all other requirements       The patient was identified by name and date of birth  Orestes Raymundo was informed that this is a telemedicine visit and that the visit is being conducted throughMicrosoft Teams and patient was informed that this is a secure, HIPAA-compliant platform  She agrees to proceed     My office door was closed  No one else was in the room  She acknowledged consent and understanding of privacy and security of the video platform  The patient has agreed to participate and understands they can discontinue the visit at any time  Patient is aware this is a billable service  Subjective  Orestes Raymundo is a 27 y o  female was counseled for 45 minutes from 9:00 - 9:45am - telehealth due to covid  Feeling more excited about pregnancy - Due 12/9 - 20 weeks now - feeling baby move  Having gender reveal party with family tomorrow  Will find out gender today     Allowing family to share about pregnancy and told coworkers  Looked at 6 houses and made offer - closing on a 3 bedroom on 9/15  Does feel triggered by checking heartbeat as no heartbeat at 16 weeks with loss 2yrs ago  Explored pregnancy after loss/loves having tattoo for baby  Explored 2yr challenges - loss, bleeding, potential issue with uterus, potential concerns about infertility, covid, etc     Did get second covid vaccine - sister who is pregnant did as well - feels some anxiety but ok with decision now  Sister due same week but will have scheduled   She and Munira Zuniga both continue to work at [employer]  Going up and down stairs less and will have accommodations at work related to heavy lifting - feels supported at work  Discussed benefits of childbirth prep classes  Reports anxiety decreased significantly - not as fearful of loss  Anxiety prevention/management, pregnancy after loss, coping skills and managing stress reviewed  HPI     Past Medical History:   Diagnosis Date    IUFD at less than 20 weeks of gestation     Urinary tract infection     hx of in the past    Varicella     hx of childhood chickenpox       Past Surgical History:   Procedure Laterality Date    DILATION AND CURETTAGE OF UTERUS      NO PAST SURGERIES      SC SURG RX MISSED ABORTN,1ST TRI N/A 1/3/2019    Procedure: DILATATION AND EVACUATION (D&E) (16 week fetal demise) ultra soung guidance;  Surgeon: Fifi Middleton MD;  Location: [facility];  Service: Gynecology       Current Outpatient Medications   Medication Sig Dispense Refill    aspirin 81 mg chewable tablet Chew 2 tablets (162 mg total) daily 180 tablet 1    Prenatal MV-Min-Fe Fum-FA-DHA (PRENATAL+DHA PO) Take by mouth Will check if this has DHA       No current facility-administered medications for this visit  No Known Allergies    Review of Systems    Video Exam    There were no vitals filed for this visit      Physical Exam Oriented, engaged, happier than previous session, calm, full range affect, appropriate speech, denies suicidal/homicidal ideations/psychosis, good insight/judgement    I spent 45 minutes directly with the patient during this visit    [address] verbally agrees to participate in [employer]  Pt is aware that [employer] could be limited without vital signs or the ability to perform a full hands-on physical Belle Beltran understands she or the provider may request at any time to terminate the video visit and request the patient to seek care or treatment in person

## 2021-07-29 NOTE — PROGRESS NOTES
Ultrasound Probe Disinfection    A transvaginal ultrasound was performed  Prior to use, disinfection was performed with High Level Disinfection Process (Trophon)  Probe serial number [id number]: [id number] was used        Trung Lionel  07/29/21  1:03 PM

## 2021-07-29 NOTE — LETTER
July 29, 2021     Jorge Adair, [address] [facility] [address]    Patient: Anabela Rosenberg   YOB: 1990   Date of Visit: 7/29/2021       Dear Dr Nidia Ramirez: Thank you for referring Anabela Rosenberg to me for evaluation  Below are my notes for this consultation  If you have questions, please do not hesitate to call me  I look forward to following your patient along with you  Sincerely,        Kelli Gagnon MD        CC: No Recipients  Kelli Gagnon MD  7/28/2021  1:35 PM  Sign when Signing Visit   Please refer to the [facility] ultrasound report in Ob Procedures for additional information regarding today's visit

## 2021-08-04 ENCOUNTER — OFFICE VISIT (OUTPATIENT)
Dept: INTERNAL MEDICINE CLINIC | Facility: CLINIC | Age: 31
End: 2021-08-04
Payer: COMMERCIAL

## 2021-08-04 VITALS
DIASTOLIC BLOOD PRESSURE: 64 MMHG | TEMPERATURE: 99.3 F | WEIGHT: 199 LBS | OXYGEN SATURATION: 98 % | SYSTOLIC BLOOD PRESSURE: 120 MMHG | HEART RATE: 92 BPM | BODY MASS INDEX: 31.23 KG/M2 | HEIGHT: 67 IN

## 2021-08-04 DIAGNOSIS — Z3A.22 22 WEEKS GESTATION OF PREGNANCY: ICD-10-CM

## 2021-08-04 DIAGNOSIS — L98.9 SKIN DISEASE: Primary | ICD-10-CM

## 2021-08-04 PROCEDURE — 3008F BODY MASS INDEX DOCD: CPT | Performed by: INTERNAL MEDICINE

## 2021-08-04 PROCEDURE — 1036F TOBACCO NON-USER: CPT | Performed by: INTERNAL MEDICINE

## 2021-08-04 PROCEDURE — 3725F SCREEN DEPRESSION PERFORMED: CPT | Performed by: INTERNAL MEDICINE

## 2021-08-04 PROCEDURE — 99213 OFFICE O/P EST LOW 20 MIN: CPT | Performed by: INTERNAL MEDICINE

## 2021-08-04 NOTE — PROGRESS NOTES
Assessment/Plan:  Reassurance given to her, due to pregnancy, increased venous pressure in the lower extremity, causing the petechial rash, advised her to monitor, needs to call me if gets worse, advised her to try to lay on her left side  1  Skin disease    2  22 weeks gestation of pregnancy           Subjective:      Patient ID: Júnior Vera is a 27 y o  female  Rash on right leg, lower part, no reaching, pregnant 22 weeks      The following portions of the patient's history were reviewed and updated as appropriate: She  has a past medical history of IUFD at less than 20 weeks of gestation, Urinary tract infection, and Varicella  She   Patient Active Problem List    Diagnosis Date Noted    22 weeks gestation of pregnancy 08/04/2021    Skin disease 08/04/2021    Prior miscarriage with pregnancy in second trimester, antepartum 07/01/2021    Multigravida in second trimester 06/24/2021    Anxiety during pregnancy 06/17/2021     She  has a past surgical history that includes No past surgeries; pr surg rx missed abortn,1st tri (N/A, 1/3/2019); and Dilation and curettage of uterus  Her family history includes Breast cancer in her mother; Cancer in her maternal grandfather; Diabetes in her maternal grandmother and mother; Hypertension in her maternal grandmother and mother; Kidney disease in her maternal grandmother; No Known Problems in her brother, father, paternal grandfather, paternal grandmother, and sister  She  reports that she has never smoked  She has never used smokeless tobacco  She reports that she does not drink alcohol and does not use drugs  Current Outpatient Medications   Medication Sig Dispense Refill    aspirin 81 mg chewable tablet Chew 2 tablets (162 mg total) daily 180 tablet 1    Prenatal MV-Min-Fe Fum-FA-DHA (PRENATAL+DHA PO) Take by mouth Will check if this has DHA       No current facility-administered medications for this visit       Current Outpatient Medications on File Prior to Visit   Medication Sig    aspirin 81 mg chewable tablet Chew 2 tablets (162 mg total) daily    Prenatal MV-Min-Fe Fum-FA-DHA (PRENATAL+DHA PO) Take by mouth Will check if this has DHA     No current facility-administered medications on file prior to visit  She has No Known Allergies       Review of Systems   Constitutional: Negative for chills and fever  HENT: Negative for congestion, ear pain and sore throat  Eyes: Negative for pain  Respiratory: Negative for cough and shortness of breath  Cardiovascular: Negative for chest pain and leg swelling  Gastrointestinal: Negative for abdominal pain, nausea and vomiting  Endocrine: Negative for polyuria  Genitourinary: Negative for difficulty urinating, frequency and urgency  Musculoskeletal: Negative for arthralgias and back pain  Skin: Positive for rash  Neurological: Negative for weakness and headaches  Psychiatric/Behavioral: Negative for sleep disturbance  The patient is not nervous/anxious  Objective:      /64 (BP Location: Right arm, Patient Position: Sitting, Cuff Size: Standard)   Pulse 92   Temp 99 3 °F (37 4 °C) (Temporal)   Ht 5' 7" (1 702 m)   Wt 90 3 kg (199 lb)   LMP 02/25/2021 (Exact Date)   SpO2 98%   BMI 31 17 kg/m²     Recent Results (from the past 1344 hour(s))   POCT urine dip    Collection Time: 06/11/21  9:52 AM   Result Value Ref Range    POCT URINE PROTEIN neg     GLUCOSE, UA neg    POCT urine dip    Collection Time: 06/24/21  2:54 PM   Result Value Ref Range    POCT URINE PROTEIN neg     GLUCOSE, UA neg    Sequential Screen 2 (Statistical)    Collection Time: 07/07/21  3:15 PM   Result Value Ref Range    Scan Result       Specimen sent to the reference lab, results pending     POCT urine dip    Collection Time: 07/09/21  3:27 PM   Result Value Ref Range    POCT URINE PROTEIN trace     GLUCOSE, UA neg    POCT urine dip    Collection Time: 07/22/21  4:03 PM   Result Value Ref Range POCT URINE PROTEIN negative     GLUCOSE, UA negative         Physical Exam  Constitutional:       Appearance: Normal appearance  HENT:      Head: Normocephalic  Right Ear: Tympanic membrane, ear canal and external ear normal       Left Ear: Tympanic membrane, ear canal and external ear normal       Nose: Nose normal  No congestion  Mouth/Throat:      Mouth: Mucous membranes are moist       Pharynx: Oropharynx is clear  No oropharyngeal exudate or posterior oropharyngeal erythema  Eyes:      Extraocular Movements: Extraocular movements intact  Conjunctiva/sclera: Conjunctivae normal       Pupils: Pupils are equal, round, and reactive to light  Cardiovascular:      Rate and Rhythm: Normal rate and regular rhythm  Heart sounds: Normal heart sounds  No murmur heard  Pulmonary:      Effort: Pulmonary effort is normal       Breath sounds: Normal breath sounds  No wheezing or rales  Abdominal:      Comments: pregnant   Musculoskeletal:         General: Normal range of motion  Cervical back: Normal range of motion and neck supple  Right lower leg: No edema  Left lower leg: No edema  Lymphadenopathy:      Cervical: No cervical adenopathy  Skin:     General: Skin is warm  Comments: Right side lower leg petechial rash present, nonblanching,   Neurological:      General: No focal deficit present  Mental Status: She is alert and oriented to person, place, and time

## 2021-08-06 ENCOUNTER — ROUTINE PRENATAL (OUTPATIENT)
Dept: OBGYN CLINIC | Facility: CLINIC | Age: 31
End: 2021-08-06

## 2021-08-06 VITALS — SYSTOLIC BLOOD PRESSURE: 112 MMHG | DIASTOLIC BLOOD PRESSURE: 62 MMHG | BODY MASS INDEX: 31.42 KG/M2 | WEIGHT: 200.6 LBS

## 2021-08-06 DIAGNOSIS — Z34.82 PRENATAL CARE, SUBSEQUENT PREGNANCY, SECOND TRIMESTER: Primary | ICD-10-CM

## 2021-08-06 PROCEDURE — PNV: Performed by: OBSTETRICS & GYNECOLOGY

## 2021-08-06 NOTE — PROGRESS NOTES
Level 2 done  - Goes back at 28 wks for F/U  Having a BOY!!!  Denies LOF, VB, CTX  GFM!!  28 wk labs next visit

## 2021-08-12 ENCOUNTER — SOCIAL WORK (OUTPATIENT)
Dept: BEHAVIORAL/MENTAL HEALTH CLINIC | Facility: CLINIC | Age: 31
End: 2021-08-12
Payer: COMMERCIAL

## 2021-08-12 DIAGNOSIS — F41.9 ANXIETY DURING PREGNANCY: Primary | ICD-10-CM

## 2021-08-12 DIAGNOSIS — O99.340 ANXIETY DURING PREGNANCY: Primary | ICD-10-CM

## 2021-08-12 PROCEDURE — 90834 PSYTX W PT 45 MINUTES: CPT | Performed by: SOCIAL WORKER

## 2021-08-12 NOTE — PSYCH
Assessment/Plan:      Diagnoses and all orders for this visit:    Anxiety during pregnancy          Subjective:     Patient ID: Stone Bronson is a 27 y o  female  Emi Denson was counseled for 45 minutes from 9:00- 9:45am   21 week ultrasound positive - found out having a Boy - did gender reveal with family and enjoyed- name baby Phi Ramsey -  Zack Rank the 2nd - will call him AJ  Registered and enjoyed but felt overwhelming  Closing on house 9/15  Patient reports anxiety significantly decreased now - feels baby moving which she enjoys  Work continues to accommodate and she and  will both have paid leave  Sister pregnant and due around same time - sharing feelings with sister and Phi Ramsey  Baby lost at 16 weeks 2yrs ago on her mind less as healthy pregnancy progresses  Coping skills, anxiety prevention/management, pregnancy after loss and healthy pregnancy/self care reviewed         HPI    Review of Systems      Objective:     Physical Exam  Oriented, engaged, happy/calm, full range affect, good eye contact, appropriate speech, denies suicidal/homicidal ideations/psychosis, good insight/judgement

## 2021-08-20 ENCOUNTER — ROUTINE PRENATAL (OUTPATIENT)
Dept: OBGYN CLINIC | Facility: CLINIC | Age: 31
End: 2021-08-20

## 2021-08-20 VITALS — DIASTOLIC BLOOD PRESSURE: 68 MMHG | WEIGHT: 203.6 LBS | BODY MASS INDEX: 31.89 KG/M2 | SYSTOLIC BLOOD PRESSURE: 112 MMHG

## 2021-08-20 DIAGNOSIS — Z34.82 ENCOUNTER FOR SUPERVISION OF OTHER NORMAL PREGNANCY IN SECOND TRIMESTER: Primary | ICD-10-CM

## 2021-08-20 DIAGNOSIS — Z34.82 MULTIGRAVIDA IN SECOND TRIMESTER: ICD-10-CM

## 2021-08-20 LAB
SL AMB  POCT GLUCOSE, UA: NEGATIVE
SL AMB POCT URINE PROTEIN: POSITIVE

## 2021-08-20 PROCEDURE — PNV: Performed by: STUDENT IN AN ORGANIZED HEALTH CARE EDUCATION/TRAINING PROGRAM

## 2021-08-20 NOTE — ASSESSMENT & PLAN NOTE
-precautions reviewed  -s/p COVID vaccination  -28wk labs ordered, reviewed today  -prepregnancy BMI 30 with goal weight gain 11-20#: TWG = 9#, appropriate

## 2021-08-20 NOTE — PROGRESS NOTES
27 y o   at 24w1d, here for return OB visit  Feeling well overall and without concerns  Good FM  Denies LOF, VB, contractions  Denies dysuria, hematuria  No problems with activity  No questions/concerns       Problem List Items Addressed This Visit        Other    Multigravida in second trimester     -precautions reviewed  -s/p COVID vaccination  -28wk labs ordered, reviewed today  -prepregnancy BMI 30 with goal weight gain 11-20#: TWG = 9#, appropriate             Other Visit Diagnoses     Encounter for supervision of other normal pregnancy in second trimester    -  Primary    Relevant Orders    CBC and differential    Glucose, 1H PG    RPR    POCT urine dip

## 2021-08-20 NOTE — PROGRESS NOTES
Pt is here for routine ob visit   No concerns at visit   Urine neg/neg   No LOF,VB,Contractions  +FM   28wk lab orders given

## 2021-08-27 ENCOUNTER — TELEPHONE (OUTPATIENT)
Dept: OBGYN CLINIC | Facility: CLINIC | Age: 31
End: 2021-08-27

## 2021-08-27 ENCOUNTER — APPOINTMENT (OUTPATIENT)
Dept: LAB | Facility: CLINIC | Age: 31
End: 2021-08-27
Payer: COMMERCIAL

## 2021-08-27 DIAGNOSIS — O99.810 ABNORMAL GLUCOSE AFFECTING PREGNANCY: Primary | ICD-10-CM

## 2021-08-27 DIAGNOSIS — Z34.82 ENCOUNTER FOR SUPERVISION OF OTHER NORMAL PREGNANCY IN SECOND TRIMESTER: ICD-10-CM

## 2021-08-27 LAB
BASOPHILS # BLD AUTO: 0.04 THOUSANDS/ΜL (ref 0–0.1)
BASOPHILS NFR BLD AUTO: 0 % (ref 0–1)
EOSINOPHIL # BLD AUTO: 0.15 THOUSAND/ΜL (ref 0–0.61)
EOSINOPHIL NFR BLD AUTO: 1 % (ref 0–6)
ERYTHROCYTE [DISTWIDTH] IN BLOOD BY AUTOMATED COUNT: 13.1 % (ref 11.6–15.1)
GLUCOSE 1H P 50 G GLC PO SERPL-MCNC: 144 MG/DL (ref 40–134)
HCT VFR BLD AUTO: 37.4 % (ref 34.8–46.1)
HGB BLD-MCNC: 12.1 G/DL (ref 11.5–15.4)
IMM GRANULOCYTES # BLD AUTO: 0.19 THOUSAND/UL (ref 0–0.2)
IMM GRANULOCYTES NFR BLD AUTO: 2 % (ref 0–2)
LYMPHOCYTES # BLD AUTO: 1.48 THOUSANDS/ΜL (ref 0.6–4.47)
LYMPHOCYTES NFR BLD AUTO: 13 % (ref 14–44)
MCH RBC QN AUTO: 30.1 PG (ref 26.8–34.3)
MCHC RBC AUTO-ENTMCNC: 32.4 G/DL (ref 31.4–37.4)
MCV RBC AUTO: 93 FL (ref 82–98)
MONOCYTES # BLD AUTO: 0.41 THOUSAND/ΜL (ref 0.17–1.22)
MONOCYTES NFR BLD AUTO: 4 % (ref 4–12)
NEUTROPHILS # BLD AUTO: 9.09 THOUSANDS/ΜL (ref 1.85–7.62)
NEUTS SEG NFR BLD AUTO: 80 % (ref 43–75)
NRBC BLD AUTO-RTO: 0 /100 WBCS
PLATELET # BLD AUTO: 236 THOUSANDS/UL (ref 149–390)
PMV BLD AUTO: 10.3 FL (ref 8.9–12.7)
RBC # BLD AUTO: 4.02 MILLION/UL (ref 3.81–5.12)
WBC # BLD AUTO: 11.36 THOUSAND/UL (ref 4.31–10.16)

## 2021-08-27 PROCEDURE — 82950 GLUCOSE TEST: CPT

## 2021-08-27 PROCEDURE — 36415 COLL VENOUS BLD VENIPUNCTURE: CPT

## 2021-08-27 PROCEDURE — 85025 COMPLETE CBC W/AUTO DIFF WBC: CPT

## 2021-08-27 PROCEDURE — 86592 SYPHILIS TEST NON-TREP QUAL: CPT

## 2021-08-27 NOTE — TELEPHONE ENCOUNTER
Spoke to pt and reviewed results  Entered 3 hr GTT & reviewed instructions        ----- Message from Alfredo Cardenas MD sent at 8/27/2021  2:43 PM EDT -----  Please call Ann Perez regarding her abnormal result  She will need a glucola   TY

## 2021-08-30 LAB — RPR SER QL: NORMAL

## 2021-09-03 ENCOUNTER — APPOINTMENT (OUTPATIENT)
Dept: LAB | Facility: CLINIC | Age: 31
End: 2021-09-03
Payer: COMMERCIAL

## 2021-09-03 DIAGNOSIS — O99.810 ABNORMAL GLUCOSE AFFECTING PREGNANCY: ICD-10-CM

## 2021-09-03 LAB
GLUCOSE 1H P 100 G GLC PO SERPL-MCNC: 153 MG/DL (ref 65–179)
GLUCOSE 2H P 100 G GLC PO SERPL-MCNC: 146 MG/DL (ref 65–154)
GLUCOSE 3H P 100 G GLC PO SERPL-MCNC: 121 MG/DL (ref 65–139)
GLUCOSE P FAST SERPL-MCNC: 82 MG/DL (ref 65–99)

## 2021-09-03 PROCEDURE — 82951 GLUCOSE TOLERANCE TEST (GTT): CPT

## 2021-09-03 PROCEDURE — 82952 GTT-ADDED SAMPLES: CPT

## 2021-09-03 PROCEDURE — 36415 COLL VENOUS BLD VENIPUNCTURE: CPT

## 2021-09-16 ENCOUNTER — ROUTINE PRENATAL (OUTPATIENT)
Dept: OBGYN CLINIC | Facility: CLINIC | Age: 31
End: 2021-09-16
Payer: COMMERCIAL

## 2021-09-16 VITALS — WEIGHT: 207.4 LBS | BODY MASS INDEX: 32.48 KG/M2 | DIASTOLIC BLOOD PRESSURE: 70 MMHG | SYSTOLIC BLOOD PRESSURE: 120 MMHG

## 2021-09-16 DIAGNOSIS — O09.292 PRIOR MISCARRIAGE WITH PREGNANCY IN SECOND TRIMESTER, ANTEPARTUM: ICD-10-CM

## 2021-09-16 DIAGNOSIS — Z23 NEED FOR TDAP VACCINATION: ICD-10-CM

## 2021-09-16 DIAGNOSIS — Z34.82 ENCOUNTER FOR SUPERVISION OF OTHER NORMAL PREGNANCY IN SECOND TRIMESTER: Primary | ICD-10-CM

## 2021-09-16 LAB
SL AMB  POCT GLUCOSE, UA: NORMAL
SL AMB POCT URINE PROTEIN: NORMAL

## 2021-09-16 PROCEDURE — 90715 TDAP VACCINE 7 YRS/> IM: CPT

## 2021-09-16 PROCEDURE — 90471 IMMUNIZATION ADMIN: CPT

## 2021-09-16 PROCEDURE — PNV: Performed by: STUDENT IN AN ORGANIZED HEALTH CARE EDUCATION/TRAINING PROGRAM

## 2021-09-16 NOTE — ASSESSMENT & PLAN NOTE
28 yo  at 28+0 here for routine visit  Continues to have a lot of anxiety and uncertainty  Reassurance and precautions extensively reviewed  Does have some pelvic discomfort but no contractions, leaking or bleeding  Good fetal movement  Follow up growth next week  Failed 1 hour passed 3 hour  Tdap today  Return in 2 wks  The patient was counseled about the labor process  She was counseled regarding potential reasons that she may need a  section including arrest of dilation/descent, non-reassuring fetal status, or worsening maternal status  She was counseled on the risks of  including bleeding, infection, and injury to surrounding structures including the bowel and bladder  She was counseled that there are medical and surgical methods to manage excessive postpartum bleeding  She was counseled that in the event of excessive blood loss, she may require blood transfusion which includes a small risk of blood borne diseases such as hepatitis and HIV  The patient is OK with receiving a blood transfusion if necessary  The patient had an opportunity to ask questions and signed consent  She was counseled about the possible need for operative delivery using the vacuum or forceps and the indications for doing so  She was counseled that there is a small risk of  complications including intracranial hemorrhage, lacerations, nerve damage or fracture as well as the increased risk for more severe maternal laceration  The patient signed consent

## 2021-09-16 NOTE — PROGRESS NOTES
Prior miscarriage with pregnancy in second trimester, antepartum  28 yo  at 28+0 here for routine visit  Continues to have a lot of anxiety and uncertainty  Reassurance and precautions extensively reviewed  Does have some pelvic discomfort but no contractions, leaking or bleeding  Good fetal movement  Follow up growth next week  Failed 1 hour passed 3 hour  Tdap today  Return in 2 wks  The patient was counseled about the labor process  She was counseled regarding potential reasons that she may need a  section including arrest of dilation/descent, non-reassuring fetal status, or worsening maternal status  She was counseled on the risks of  including bleeding, infection, and injury to surrounding structures including the bowel and bladder  She was counseled that there are medical and surgical methods to manage excessive postpartum bleeding  She was counseled that in the event of excessive blood loss, she may require blood transfusion which includes a small risk of blood borne diseases such as hepatitis and HIV  The patient is OK with receiving a blood transfusion if necessary  The patient had an opportunity to ask questions and signed consent  She was counseled about the possible need for operative delivery using the vacuum or forceps and the indications for doing so  She was counseled that there is a small risk of  complications including intracranial hemorrhage, lacerations, nerve damage or fracture as well as the increased risk for more severe maternal laceration  The patient signed consent

## 2021-09-16 NOTE — PROGRESS NOTES
Patient reports she is doing well; complaints of lower pelvic cramping that comes and goes  GFM  Yellow folder and TDAP given today

## 2021-09-23 ENCOUNTER — TELEMEDICINE (OUTPATIENT)
Dept: BEHAVIORAL/MENTAL HEALTH CLINIC | Facility: CLINIC | Age: 31
End: 2021-09-23
Payer: COMMERCIAL

## 2021-09-23 ENCOUNTER — ULTRASOUND (OUTPATIENT)
Dept: PERINATAL CARE | Facility: CLINIC | Age: 31
End: 2021-09-23
Payer: COMMERCIAL

## 2021-09-23 VITALS
HEIGHT: 67 IN | WEIGHT: 210 LBS | HEART RATE: 94 BPM | SYSTOLIC BLOOD PRESSURE: 132 MMHG | DIASTOLIC BLOOD PRESSURE: 59 MMHG | BODY MASS INDEX: 32.96 KG/M2

## 2021-09-23 DIAGNOSIS — Z3A.29 29 WEEKS GESTATION OF PREGNANCY: Primary | ICD-10-CM

## 2021-09-23 DIAGNOSIS — O99.340 ANXIETY DURING PREGNANCY: Primary | ICD-10-CM

## 2021-09-23 DIAGNOSIS — F41.9 ANXIETY DURING PREGNANCY: Primary | ICD-10-CM

## 2021-09-23 PROCEDURE — 90832 PSYTX W PT 30 MINUTES: CPT | Performed by: SOCIAL WORKER

## 2021-09-23 PROCEDURE — 99213 OFFICE O/P EST LOW 20 MIN: CPT | Performed by: OBSTETRICS & GYNECOLOGY

## 2021-09-23 PROCEDURE — 76816 OB US FOLLOW-UP PER FETUS: CPT | Performed by: OBSTETRICS & GYNECOLOGY

## 2021-09-23 PROCEDURE — 3008F BODY MASS INDEX DOCD: CPT | Performed by: INTERNAL MEDICINE

## 2021-09-23 NOTE — PSYCH
Virtual Regular Visit    Verification of patient location:    Patient is located in the following state in which I hold an active license [place]      Assessment/Plan:    Problem List Items Addressed This Visit        Other    Anxiety during pregnancy - Primary          Goals addressed in session: Manage/prevent anxiety          Reason for visit is   Chief Complaint   Patient presents with    Virtual Regular Visit        Encounter provider Nell Jacob    Provider located at [address]      Recent Visits  No visits were found meeting these conditions  Showing recent visits within past 7 days and meeting all other requirements  Today's Visits  Date Type Provider Dept   09/23/21 Telemedicine Nell Jacob [facility]   Showing today's visits and meeting all other requirements  Future Appointments  No visits were found meeting these conditions  Showing future appointments within next 150 days and meeting all other requirements       The patient was identified by name and date of birth  Joanna Kurtz was informed that this is a telemedicine visit and that the visit is being conducted throughMicrosoft Teams and patient was informed that this is a secure, HIPAA-compliant platform  She agrees to proceed     My office door was closed  No one else was in the room  She acknowledged consent and understanding of privacy and security of the video platform  The patient has agreed to participate and understands they can discontinue the visit at any time  Patient is aware this is a billable service  Subjective  Joanna Kurtz is a 27 y o  female was counseled for 30 minutes from 10:15 - 10:45am - telehealth due to covid  Feels baby moving a lot - 34 weeks pregnant with boy - Gelene Quivers  Has ultrasound later today for 3rd trimester    Closed on house on 9/15 as planned and slowly moving into house - big move on Saturday - family/friend helping  Had a 16 week loss 2yrs ago - decreased anxiety with baby moving and moving further along  Some anxiety about labor, some cramping at times - will discuss with OB today - feels baby is low  Baby shower is 10/23 at sister's house - rest is a surprise  Feels anxiety improved dramatically now that pregnancy viable  Patient would like to call as needed in future but not have counseling on a regular basis  Reviewed anxiety prevention, self care, coping skills and preparing mentally for baby  HPI     Past Medical History:   Diagnosis Date    IUFD at less than 20 weeks of gestation     Urinary tract infection     hx of in the past    Varicella     hx of childhood chickenpox       Past Surgical History:   Procedure Laterality Date    DILATION AND CURETTAGE OF UTERUS      NO PAST SURGERIES      [place] SURG RX MISSED ABORTN,1ST TRI N/A 1/3/2019    Procedure: DILATATION AND EVACUATION (D&E) (16 week fetal demise) ultra soung guidance;  Surgeon: Netta Jovel MD;  [facility];  Service: Gynecology       Current Outpatient Medications   Medication Sig Dispense Refill    aspirin 81 mg chewable tablet Chew 2 tablets (162 mg total) daily 180 tablet 1    Prenatal MV-Min-Fe Fum-FA-DHA (PRENATAL+DHA PO) Take by mouth Will check if this has DHA       No current facility-administered medications for this visit  No Known Allergies    Review of Systems    Video Exam    There were no vitals filed for this visit  Physical Exam Oriented, engaged, happy/calm, full range affect, appropriate speech, denies suicidal/homicidal ideations/psychosis, good insight/judgement    I spent 30 minutes directly with the patient during this visit    VIRTUAL VISIT [facility] verbally agrees to participate in [employer]   Pt is aware that [employer] could be limited without vital signs or the ability to perform a full hands-on physical Lorri Sevilla understands she or the provider may request at any time to terminate the video visit and request the patient to seek care or treatment in person

## 2021-09-29 ENCOUNTER — ROUTINE PRENATAL (OUTPATIENT)
Dept: OBGYN CLINIC | Facility: CLINIC | Age: 31
End: 2021-09-29

## 2021-09-29 VITALS — WEIGHT: 209 LBS | SYSTOLIC BLOOD PRESSURE: 100 MMHG | BODY MASS INDEX: 32.73 KG/M2 | DIASTOLIC BLOOD PRESSURE: 60 MMHG

## 2021-09-29 DIAGNOSIS — Z34.82 ENCOUNTER FOR SUPERVISION OF OTHER NORMAL PREGNANCY IN SECOND TRIMESTER: Primary | ICD-10-CM

## 2021-09-29 LAB
SL AMB  POCT GLUCOSE, UA: NEGATIVE
SL AMB POCT URINE PROTEIN: ABNORMAL

## 2021-09-29 PROCEDURE — PNV: Performed by: OBSTETRICS & GYNECOLOGY

## 2021-09-29 NOTE — PROGRESS NOTES
Prenatal visit at 34 6/7wks  Patient feeling well overall  Some BH ctxs but denies LOF and VB  Good FM  She is having difficulty with fatigue and contractions at work  She has enough pain that she has had to leave work early on more than one occasion  Discussed restricting her to 8 hours  She also struggles with lifting larger items and bending  Encouraged her to continue to work while she can as these actions may be awkward/uncomfortable but are not dangerous  She does not feel she could afford to go out on disability at this time and is concerned that a note with other restrictions could lead her to be put out on disability  I reiterated my earlier counseling that we cannot rescind notes once they are sent so that she should consider any request for restrictions seriously  Delivery consent signed last visit with Dr Gia Farnsworth  Passed 3 hour GTT  Had COVID and TDAP vaccines  Precautions given

## 2021-10-09 PROBLEM — Z3A.29 29 WEEKS GESTATION OF PREGNANCY: Status: ACTIVE | Noted: 2021-08-04

## 2021-10-14 ENCOUNTER — ROUTINE PRENATAL (OUTPATIENT)
Dept: OBGYN CLINIC | Facility: CLINIC | Age: 31
End: 2021-10-14

## 2021-10-14 VITALS — SYSTOLIC BLOOD PRESSURE: 110 MMHG | WEIGHT: 215 LBS | BODY MASS INDEX: 33.67 KG/M2 | DIASTOLIC BLOOD PRESSURE: 60 MMHG

## 2021-10-14 DIAGNOSIS — Z34.80 SUPERVISION OF OTHER NORMAL PREGNANCY: Primary | ICD-10-CM

## 2021-10-14 PROBLEM — O99.210 MATERNAL OBESITY AFFECTING PREGNANCY, ANTEPARTUM: Status: ACTIVE | Noted: 2021-10-14

## 2021-10-14 LAB
SL AMB  POCT GLUCOSE, UA: NORMAL
SL AMB POCT URINE PROTEIN: NORMAL

## 2021-10-14 PROCEDURE — PNV: Performed by: NURSE PRACTITIONER

## 2021-10-29 ENCOUNTER — ROUTINE PRENATAL (OUTPATIENT)
Dept: OBGYN CLINIC | Facility: CLINIC | Age: 31
End: 2021-10-29

## 2021-10-29 VITALS — WEIGHT: 217.4 LBS | DIASTOLIC BLOOD PRESSURE: 72 MMHG | SYSTOLIC BLOOD PRESSURE: 112 MMHG | BODY MASS INDEX: 34.05 KG/M2

## 2021-10-29 DIAGNOSIS — Z34.83 PRENATAL CARE, SUBSEQUENT PREGNANCY, THIRD TRIMESTER: Primary | ICD-10-CM

## 2021-10-29 LAB
SL AMB  POCT GLUCOSE, UA: NORMAL
SL AMB POCT URINE PROTEIN: NORMAL

## 2021-10-29 PROCEDURE — PNV: Performed by: OBSTETRICS & GYNECOLOGY

## 2021-11-04 PROBLEM — Z3A.29 29 WEEKS GESTATION OF PREGNANCY: Status: RESOLVED | Noted: 2021-08-04 | Resolved: 2021-11-04

## 2021-11-11 ENCOUNTER — ROUTINE PRENATAL (OUTPATIENT)
Dept: OBGYN CLINIC | Facility: CLINIC | Age: 31
End: 2021-11-11
Payer: COMMERCIAL

## 2021-11-11 VITALS — SYSTOLIC BLOOD PRESSURE: 102 MMHG | WEIGHT: 222.8 LBS | BODY MASS INDEX: 34.9 KG/M2 | DIASTOLIC BLOOD PRESSURE: 74 MMHG

## 2021-11-11 DIAGNOSIS — O99.210 MATERNAL OBESITY AFFECTING PREGNANCY, ANTEPARTUM: ICD-10-CM

## 2021-11-11 DIAGNOSIS — Z23 NEED FOR INFLUENZA VACCINATION: Primary | ICD-10-CM

## 2021-11-11 DIAGNOSIS — Z34.80 SUPERVISION OF OTHER NORMAL PREGNANCY: ICD-10-CM

## 2021-11-11 LAB
SL AMB  POCT GLUCOSE, UA: NORMAL
SL AMB POCT URINE PROTEIN: NORMAL

## 2021-11-11 PROCEDURE — PNV: Performed by: NURSE PRACTITIONER

## 2021-11-11 PROCEDURE — 87150 DNA/RNA AMPLIFIED PROBE: CPT | Performed by: NURSE PRACTITIONER

## 2021-11-11 PROCEDURE — 90686 IIV4 VACC NO PRSV 0.5 ML IM: CPT | Performed by: NURSE PRACTITIONER

## 2021-11-11 PROCEDURE — 90471 IMMUNIZATION ADMIN: CPT | Performed by: NURSE PRACTITIONER

## 2021-11-13 LAB — GP B STREP DNA SPEC QL NAA+PROBE: NEGATIVE

## 2021-11-18 ENCOUNTER — ROUTINE PRENATAL (OUTPATIENT)
Dept: OBGYN CLINIC | Facility: CLINIC | Age: 31
End: 2021-11-18

## 2021-11-18 VITALS — BODY MASS INDEX: 35.33 KG/M2 | WEIGHT: 225.6 LBS | DIASTOLIC BLOOD PRESSURE: 70 MMHG | SYSTOLIC BLOOD PRESSURE: 116 MMHG

## 2021-11-18 DIAGNOSIS — O99.210 MATERNAL OBESITY AFFECTING PREGNANCY, ANTEPARTUM: ICD-10-CM

## 2021-11-18 DIAGNOSIS — Z34.80 SUPERVISION OF OTHER NORMAL PREGNANCY: Primary | ICD-10-CM

## 2021-11-18 DIAGNOSIS — R81 GLUCOSURIA: ICD-10-CM

## 2021-11-18 LAB
SL AMB  POCT GLUCOSE, UA: 1
SL AMB POCT URINE PROTEIN: ABNORMAL

## 2021-11-18 PROCEDURE — PNV: Performed by: NURSE PRACTITIONER

## 2021-11-26 ENCOUNTER — ROUTINE PRENATAL (OUTPATIENT)
Dept: OBGYN CLINIC | Facility: CLINIC | Age: 31
End: 2021-11-26

## 2021-11-26 VITALS — SYSTOLIC BLOOD PRESSURE: 112 MMHG | WEIGHT: 230.8 LBS | BODY MASS INDEX: 36.15 KG/M2 | DIASTOLIC BLOOD PRESSURE: 72 MMHG

## 2021-11-26 DIAGNOSIS — Z34.83 ENCOUNTER FOR SUPERVISION OF OTHER NORMAL PREGNANCY IN THIRD TRIMESTER: Primary | ICD-10-CM

## 2021-11-26 DIAGNOSIS — Z34.80 SUPERVISION OF OTHER NORMAL PREGNANCY: ICD-10-CM

## 2021-11-26 LAB
SL AMB  POCT GLUCOSE, UA: NEGATIVE
SL AMB POCT URINE PROTEIN: NEGATIVE

## 2021-11-26 PROCEDURE — PNV: Performed by: STUDENT IN AN ORGANIZED HEALTH CARE EDUCATION/TRAINING PROGRAM

## 2021-12-02 ENCOUNTER — ROUTINE PRENATAL (OUTPATIENT)
Dept: OBGYN CLINIC | Facility: CLINIC | Age: 31
End: 2021-12-02

## 2021-12-02 VITALS — WEIGHT: 231.4 LBS | SYSTOLIC BLOOD PRESSURE: 112 MMHG | DIASTOLIC BLOOD PRESSURE: 68 MMHG | BODY MASS INDEX: 36.24 KG/M2

## 2021-12-02 DIAGNOSIS — O99.210 MATERNAL OBESITY AFFECTING PREGNANCY, ANTEPARTUM: ICD-10-CM

## 2021-12-02 DIAGNOSIS — Z34.80 SUPERVISION OF OTHER NORMAL PREGNANCY: Primary | ICD-10-CM

## 2021-12-02 LAB
SL AMB  POCT GLUCOSE, UA: NORMAL
SL AMB POCT URINE PROTEIN: NORMAL

## 2021-12-02 PROCEDURE — PNV: Performed by: NURSE PRACTITIONER

## 2021-12-10 ENCOUNTER — TELEPHONE (OUTPATIENT)
Dept: OBGYN CLINIC | Facility: CLINIC | Age: 31
End: 2021-12-10

## 2021-12-10 ENCOUNTER — ROUTINE PRENATAL (OUTPATIENT)
Dept: OBGYN CLINIC | Facility: CLINIC | Age: 31
End: 2021-12-10

## 2021-12-10 VITALS — BODY MASS INDEX: 36.81 KG/M2 | SYSTOLIC BLOOD PRESSURE: 112 MMHG | WEIGHT: 235 LBS | DIASTOLIC BLOOD PRESSURE: 62 MMHG

## 2021-12-10 DIAGNOSIS — Z34.83 PRENATAL CARE, SUBSEQUENT PREGNANCY, THIRD TRIMESTER: Primary | ICD-10-CM

## 2021-12-10 LAB
SL AMB  POCT GLUCOSE, UA: NORMAL
SL AMB POCT URINE PROTEIN: NORMAL

## 2021-12-10 PROCEDURE — PNV: Performed by: OBSTETRICS & GYNECOLOGY

## 2021-12-15 ENCOUNTER — HOSPITAL ENCOUNTER (OUTPATIENT)
Dept: LABOR AND DELIVERY | Facility: HOSPITAL | Age: 31
Discharge: HOME | End: 2021-12-15
Payer: COMMERCIAL

## 2021-12-15 ENCOUNTER — HOSPITAL ENCOUNTER (INPATIENT)
Facility: HOSPITAL | Age: 31
LOS: 4 days | Discharge: HOME | End: 2021-12-19
Attending: STUDENT IN AN ORGANIZED HEALTH CARE EDUCATION/TRAINING PROGRAM | Admitting: OBSTETRICS & GYNECOLOGY
Payer: COMMERCIAL

## 2021-12-15 DIAGNOSIS — Z3A.40 40 WEEKS GESTATION OF PREGNANCY: ICD-10-CM

## 2021-12-15 DIAGNOSIS — N17.9 ACUTE KIDNEY INJURY (HCC): ICD-10-CM

## 2021-12-15 LAB
ABO GROUP BLD: NORMAL
BLD GP AB SCN SERPL QL: NEGATIVE
ERYTHROCYTE [DISTWIDTH] IN BLOOD BY AUTOMATED COUNT: 14.4 % (ref 11.6–15.1)
HCT VFR BLD AUTO: 39.9 % (ref 34.8–46.1)
HGB BLD-MCNC: 13.1 G/DL (ref 11.5–15.4)
MCH RBC QN AUTO: 29.6 PG (ref 26.8–34.3)
MCHC RBC AUTO-ENTMCNC: 32.8 G/DL (ref 31.4–37.4)
MCV RBC AUTO: 90 FL (ref 82–98)
PLATELET # BLD AUTO: 218 THOUSANDS/UL (ref 149–390)
PMV BLD AUTO: 10.5 FL (ref 8.9–12.7)
RBC # BLD AUTO: 4.43 MILLION/UL (ref 3.81–5.12)
RH BLD: POSITIVE
SPECIMEN EXPIRATION DATE: NORMAL
WBC # BLD AUTO: 11.56 THOUSAND/UL (ref 4.31–10.16)

## 2021-12-15 PROCEDURE — 86592 SYPHILIS TEST NON-TREP QUAL: CPT

## 2021-12-15 PROCEDURE — 3E033VJ INTRODUCTION OF OTHER HORMONE INTO PERIPHERAL VEIN, PERCUTANEOUS APPROACH: ICD-10-PCS | Performed by: STUDENT IN AN ORGANIZED HEALTH CARE EDUCATION/TRAINING PROGRAM

## 2021-12-15 PROCEDURE — 85027 COMPLETE CBC AUTOMATED: CPT

## 2021-12-15 PROCEDURE — 4A1HXCZ MONITORING OF PRODUCTS OF CONCEPTION, CARDIAC RATE, EXTERNAL APPROACH: ICD-10-PCS | Performed by: STUDENT IN AN ORGANIZED HEALTH CARE EDUCATION/TRAINING PROGRAM

## 2021-12-15 PROCEDURE — NC001 PR NO CHARGE: Performed by: OBSTETRICS & GYNECOLOGY

## 2021-12-15 PROCEDURE — 3E0P7VZ INTRODUCTION OF HORMONE INTO FEMALE REPRODUCTIVE, VIA NATURAL OR ARTIFICIAL OPENING: ICD-10-PCS | Performed by: STUDENT IN AN ORGANIZED HEALTH CARE EDUCATION/TRAINING PROGRAM

## 2021-12-15 PROCEDURE — 86850 RBC ANTIBODY SCREEN: CPT

## 2021-12-15 PROCEDURE — 86900 BLOOD TYPING SEROLOGIC ABO: CPT

## 2021-12-15 PROCEDURE — 86901 BLOOD TYPING SEROLOGIC RH(D): CPT

## 2021-12-15 RX ORDER — ONDANSETRON 2 MG/ML
4 INJECTION INTRAMUSCULAR; INTRAVENOUS EVERY 6 HOURS PRN
Status: DISCONTINUED | OUTPATIENT
Start: 2021-12-15 | End: 2021-12-16

## 2021-12-15 RX ORDER — ACETAMINOPHEN 325 MG/1
650 TABLET ORAL EVERY 6 HOURS PRN
Status: DISCONTINUED | OUTPATIENT
Start: 2021-12-15 | End: 2021-12-16

## 2021-12-15 RX ORDER — SODIUM CHLORIDE, SODIUM LACTATE, POTASSIUM CHLORIDE, CALCIUM CHLORIDE 600; 310; 30; 20 MG/100ML; MG/100ML; MG/100ML; MG/100ML
125 INJECTION, SOLUTION INTRAVENOUS CONTINUOUS
Status: DISCONTINUED | OUTPATIENT
Start: 2021-12-15 | End: 2021-12-16

## 2021-12-15 RX ADMIN — SODIUM CHLORIDE, SODIUM LACTATE, POTASSIUM CHLORIDE, AND CALCIUM CHLORIDE 125 ML/HR: .6; .31; .03; .02 INJECTION, SOLUTION INTRAVENOUS at 21:46

## 2021-12-15 RX ADMIN — Medication 50 MCG: at 23:25

## 2021-12-16 ENCOUNTER — ANESTHESIA (INPATIENT)
Dept: LABOR AND DELIVERY | Facility: HOSPITAL | Age: 31
End: 2021-12-16
Payer: COMMERCIAL

## 2021-12-16 ENCOUNTER — ANESTHESIA EVENT (INPATIENT)
Dept: LABOR AND DELIVERY | Facility: HOSPITAL | Age: 31
End: 2021-12-16
Payer: COMMERCIAL

## 2021-12-16 PROBLEM — O13.9 GESTATIONAL HYPERTENSION: Status: ACTIVE | Noted: 2021-12-16

## 2021-12-16 LAB
ALBUMIN SERPL BCP-MCNC: 2.8 G/DL (ref 3.5–5)
ALP SERPL-CCNC: 166 U/L (ref 46–116)
ALT SERPL W P-5'-P-CCNC: 19 U/L (ref 12–78)
ANION GAP SERPL CALCULATED.3IONS-SCNC: 20 MMOL/L (ref 4–13)
AST SERPL W P-5'-P-CCNC: 20 U/L (ref 5–45)
BASE EXCESS BLDCOA CALC-SCNC: -13.2 MMOL/L (ref 3–11)
BASE EXCESS BLDCOV CALC-SCNC: -11.7 MMOL/L (ref 1–9)
BILIRUB SERPL-MCNC: 0.15 MG/DL (ref 0.2–1)
BUN SERPL-MCNC: 14 MG/DL (ref 5–25)
CALCIUM ALBUM COR SERPL-MCNC: 10.7 MG/DL (ref 8.3–10.1)
CALCIUM SERPL-MCNC: 9.7 MG/DL (ref 8.3–10.1)
CHLORIDE SERPL-SCNC: 102 MMOL/L (ref 100–108)
CO2 SERPL-SCNC: 18 MMOL/L (ref 21–32)
CREAT SERPL-MCNC: 0.92 MG/DL (ref 0.6–1.3)
CREAT UR-MCNC: 161 MG/DL
GFR SERPL CREATININE-BSD FRML MDRD: 82 ML/MIN/1.73SQ M
GLUCOSE SERPL-MCNC: 39 MG/DL (ref 65–140)
GLUCOSE SERPL-MCNC: 84 MG/DL (ref 65–140)
HCO3 BLDCOA-SCNC: 16.5 MMOL/L (ref 17.3–27.3)
HCO3 BLDCOV-SCNC: 14.9 MMOL/L (ref 12.2–28.6)
O2 CT VFR BLDCOA CALC: 13.1 ML/DL
OXYHGB MFR BLDCOA: 50.3 %
OXYHGB MFR BLDCOV: 67.3 %
PCO2 BLDCOA: 51.9 MM[HG] (ref 30–60)
PCO2 BLDCOV: 37 MM HG (ref 27–43)
PH BLDCOA: 7.12 [PH] (ref 7.23–7.43)
PH BLDCOV: 7.22 [PH] (ref 7.19–7.49)
PO2 BLDCOA: 26.9 MM HG (ref 5–25)
PO2 BLDCOV: 32.2 MM HG (ref 15–45)
POTASSIUM SERPL-SCNC: 3.7 MMOL/L (ref 3.5–5.3)
PROT SERPL-MCNC: 7.5 G/DL (ref 6.4–8.2)
PROT UR-MCNC: 37 MG/DL
PROT/CREAT UR: 0.23 MG/G{CREAT} (ref 0–0.1)
SAO2 % BLDCOV: 18.1 ML/DL
SODIUM SERPL-SCNC: 140 MMOL/L (ref 136–145)

## 2021-12-16 PROCEDURE — 84156 ASSAY OF PROTEIN URINE: CPT

## 2021-12-16 PROCEDURE — 59400 OBSTETRICAL CARE: CPT | Performed by: STUDENT IN AN ORGANIZED HEALTH CARE EDUCATION/TRAINING PROGRAM

## 2021-12-16 PROCEDURE — 82948 REAGENT STRIP/BLOOD GLUCOSE: CPT

## 2021-12-16 PROCEDURE — 0DQP0ZZ REPAIR RECTUM, OPEN APPROACH: ICD-10-PCS | Performed by: STUDENT IN AN ORGANIZED HEALTH CARE EDUCATION/TRAINING PROGRAM

## 2021-12-16 PROCEDURE — NC001 PR NO CHARGE: Performed by: STUDENT IN AN ORGANIZED HEALTH CARE EDUCATION/TRAINING PROGRAM

## 2021-12-16 PROCEDURE — 80053 COMPREHEN METABOLIC PANEL: CPT

## 2021-12-16 PROCEDURE — A4338 INDWELLING CATHETER LATEX: HCPCS | Performed by: STUDENT IN AN ORGANIZED HEALTH CARE EDUCATION/TRAINING PROGRAM

## 2021-12-16 PROCEDURE — 10907ZC DRAINAGE OF AMNIOTIC FLUID, THERAPEUTIC FROM PRODUCTS OF CONCEPTION, VIA NATURAL OR ARTIFICIAL OPENING: ICD-10-PCS | Performed by: STUDENT IN AN ORGANIZED HEALTH CARE EDUCATION/TRAINING PROGRAM

## 2021-12-16 PROCEDURE — 82805 BLOOD GASES W/O2 SATURATION: CPT | Performed by: STUDENT IN AN ORGANIZED HEALTH CARE EDUCATION/TRAINING PROGRAM

## 2021-12-16 PROCEDURE — 82570 ASSAY OF URINE CREATININE: CPT

## 2021-12-16 RX ORDER — LIDOCAINE HYDROCHLORIDE AND EPINEPHRINE 15; 5 MG/ML; UG/ML
INJECTION, SOLUTION EPIDURAL AS NEEDED
Status: DISCONTINUED | OUTPATIENT
Start: 2021-12-16 | End: 2021-12-16

## 2021-12-16 RX ORDER — IBUPROFEN 600 MG/1
600 TABLET ORAL EVERY 6 HOURS PRN
Status: DISCONTINUED | OUTPATIENT
Start: 2021-12-16 | End: 2021-12-16

## 2021-12-16 RX ORDER — CALCIUM CARBONATE 200(500)MG
1000 TABLET,CHEWABLE ORAL DAILY PRN
Status: DISCONTINUED | OUTPATIENT
Start: 2021-12-16 | End: 2021-12-19 | Stop reason: HOSPADM

## 2021-12-16 RX ORDER — METOCLOPRAMIDE HYDROCHLORIDE 5 MG/ML
10 INJECTION INTRAMUSCULAR; INTRAVENOUS EVERY 6 HOURS PRN
Status: DISCONTINUED | OUTPATIENT
Start: 2021-12-16 | End: 2021-12-19 | Stop reason: HOSPADM

## 2021-12-16 RX ORDER — DIPHENHYDRAMINE HYDROCHLORIDE 50 MG/ML
25 INJECTION INTRAMUSCULAR; INTRAVENOUS EVERY 6 HOURS PRN
Status: DISCONTINUED | OUTPATIENT
Start: 2021-12-16 | End: 2021-12-19 | Stop reason: HOSPADM

## 2021-12-16 RX ORDER — DIAPER,BRIEF,INFANT-TODD,DISP
1 EACH MISCELLANEOUS 4 TIMES DAILY PRN
Status: DISCONTINUED | OUTPATIENT
Start: 2021-12-16 | End: 2021-12-19 | Stop reason: HOSPADM

## 2021-12-16 RX ORDER — ACETAMINOPHEN 325 MG/1
650 TABLET ORAL EVERY 6 HOURS PRN
Status: DISCONTINUED | OUTPATIENT
Start: 2021-12-16 | End: 2021-12-16

## 2021-12-16 RX ORDER — ONDANSETRON 2 MG/ML
4 INJECTION INTRAMUSCULAR; INTRAVENOUS EVERY 8 HOURS PRN
Status: DISCONTINUED | OUTPATIENT
Start: 2021-12-16 | End: 2021-12-19 | Stop reason: HOSPADM

## 2021-12-16 RX ORDER — LIDOCAINE HYDROCHLORIDE 20 MG/ML
INJECTION, SOLUTION EPIDURAL; INFILTRATION; INTRACAUDAL; PERINEURAL AS NEEDED
Status: DISCONTINUED | OUTPATIENT
Start: 2021-12-16 | End: 2021-12-16

## 2021-12-16 RX ORDER — CEFOXITIN SODIUM 1 G/50ML
INJECTION, SOLUTION INTRAVENOUS AS NEEDED
Status: DISCONTINUED | OUTPATIENT
Start: 2021-12-16 | End: 2021-12-16

## 2021-12-16 RX ORDER — OXYCODONE HYDROCHLORIDE 10 MG/1
10 TABLET ORAL EVERY 4 HOURS PRN
Status: DISCONTINUED | OUTPATIENT
Start: 2021-12-16 | End: 2021-12-19 | Stop reason: HOSPADM

## 2021-12-16 RX ORDER — OXYTOCIN/RINGER'S LACTATE 30/500 ML
1-30 PLASTIC BAG, INJECTION (ML) INTRAVENOUS
Status: DISCONTINUED | OUTPATIENT
Start: 2021-12-16 | End: 2021-12-16

## 2021-12-16 RX ORDER — POLYETHYLENE GLYCOL 3350 17 G/17G
17 POWDER, FOR SOLUTION ORAL DAILY
Status: DISCONTINUED | OUTPATIENT
Start: 2021-12-17 | End: 2021-12-19 | Stop reason: HOSPADM

## 2021-12-16 RX ORDER — KETOROLAC TROMETHAMINE 30 MG/ML
INJECTION, SOLUTION INTRAMUSCULAR; INTRAVENOUS AS NEEDED
Status: DISCONTINUED | OUTPATIENT
Start: 2021-12-16 | End: 2021-12-16

## 2021-12-16 RX ORDER — CEFOXITIN SODIUM 1 G/50ML
INJECTION, SOLUTION INTRAVENOUS
Status: COMPLETED
Start: 2021-12-16 | End: 2021-12-16

## 2021-12-16 RX ORDER — OXYCODONE HYDROCHLORIDE 5 MG/1
5 TABLET ORAL EVERY 4 HOURS PRN
Status: DISCONTINUED | OUTPATIENT
Start: 2021-12-16 | End: 2021-12-19 | Stop reason: HOSPADM

## 2021-12-16 RX ORDER — ACETAMINOPHEN 325 MG/1
650 TABLET ORAL EVERY 6 HOURS SCHEDULED
Status: DISCONTINUED | OUTPATIENT
Start: 2021-12-17 | End: 2021-12-16

## 2021-12-16 RX ORDER — ACETAMINOPHEN 325 MG/1
650 TABLET ORAL EVERY 6 HOURS SCHEDULED
Status: DISCONTINUED | OUTPATIENT
Start: 2021-12-17 | End: 2021-12-19 | Stop reason: HOSPADM

## 2021-12-16 RX ORDER — IBUPROFEN 600 MG/1
600 TABLET ORAL EVERY 6 HOURS SCHEDULED
Status: DISCONTINUED | OUTPATIENT
Start: 2021-12-17 | End: 2021-12-16

## 2021-12-16 RX ORDER — DOCUSATE SODIUM 100 MG/1
100 CAPSULE, LIQUID FILLED ORAL 2 TIMES DAILY
Status: DISCONTINUED | OUTPATIENT
Start: 2021-12-16 | End: 2021-12-19 | Stop reason: HOSPADM

## 2021-12-16 RX ORDER — BUTORPHANOL TARTRATE 1 MG/ML
1 INJECTION, SOLUTION INTRAMUSCULAR; INTRAVENOUS ONCE
Status: COMPLETED | OUTPATIENT
Start: 2021-12-16 | End: 2021-12-16

## 2021-12-16 RX ORDER — OXYTOCIN/RINGER'S LACTATE 30/500 ML
250 PLASTIC BAG, INJECTION (ML) INTRAVENOUS CONTINUOUS
Status: ACTIVE | OUTPATIENT
Start: 2021-12-16 | End: 2021-12-16

## 2021-12-16 RX ORDER — KETOROLAC TROMETHAMINE 30 MG/ML
30 INJECTION, SOLUTION INTRAMUSCULAR; INTRAVENOUS EVERY 6 HOURS SCHEDULED
Status: COMPLETED | OUTPATIENT
Start: 2021-12-17 | End: 2021-12-18

## 2021-12-16 RX ADMIN — WITCH HAZEL 1 PAD: 500 SOLUTION RECTAL; TOPICAL at 23:06

## 2021-12-16 RX ADMIN — HYDROCORTISONE 1 APPLICATION: 1 CREAM TOPICAL at 23:06

## 2021-12-16 RX ADMIN — BUTORPHANOL TARTRATE 1 MG: 1 INJECTION, SOLUTION INTRAMUSCULAR; INTRAVENOUS at 05:00

## 2021-12-16 RX ADMIN — SODIUM CHLORIDE, SODIUM LACTATE, POTASSIUM CHLORIDE, AND CALCIUM CHLORIDE 125 ML/HR: .6; .31; .03; .02 INJECTION, SOLUTION INTRAVENOUS at 03:43

## 2021-12-16 RX ADMIN — CEFOXITIN SODIUM 1000 MG: 1 INJECTION, SOLUTION INTRAVENOUS at 20:50

## 2021-12-16 RX ADMIN — ROPIVACAINE HYDROCHLORIDE: 2 INJECTION, SOLUTION EPIDURAL; INFILTRATION at 13:50

## 2021-12-16 RX ADMIN — SODIUM CHLORIDE, SODIUM LACTATE, POTASSIUM CHLORIDE, AND CALCIUM CHLORIDE 999 ML/HR: .6; .31; .03; .02 INJECTION, SOLUTION INTRAVENOUS at 18:20

## 2021-12-16 RX ADMIN — SODIUM CHLORIDE, SODIUM LACTATE, POTASSIUM CHLORIDE, AND CALCIUM CHLORIDE 125 ML/HR: .6; .31; .03; .02 INJECTION, SOLUTION INTRAVENOUS at 13:20

## 2021-12-16 RX ADMIN — Medication 2 MILLI-UNITS/MIN: at 07:59

## 2021-12-16 RX ADMIN — LIDOCAINE HYDROCHLORIDE 5 MG: 20 INJECTION, SOLUTION EPIDURAL; INFILTRATION; INTRACAUDAL at 20:50

## 2021-12-16 RX ADMIN — LIDOCAINE HYDROCHLORIDE AND EPINEPHRINE 3 ML: 15; 5 INJECTION, SOLUTION EPIDURAL at 13:43

## 2021-12-16 RX ADMIN — Medication 250 MILLI-UNITS/MIN: at 21:50

## 2021-12-16 RX ADMIN — LIDOCAINE HYDROCHLORIDE 3 MG: 20 INJECTION, SOLUTION EPIDURAL; INFILTRATION; INTRACAUDAL at 20:52

## 2021-12-16 RX ADMIN — ACETAMINOPHEN 650 MG: 325 TABLET, FILM COATED ORAL at 23:06

## 2021-12-16 RX ADMIN — LIDOCAINE HYDROCHLORIDE AND EPINEPHRINE 2 ML: 15; 5 INJECTION, SOLUTION EPIDURAL at 13:44

## 2021-12-16 RX ADMIN — METOCLOPRAMIDE 10 MG: 5 INJECTION, SOLUTION INTRAMUSCULAR; INTRAVENOUS at 22:16

## 2021-12-16 RX ADMIN — BENZOCAINE AND LEVOMENTHOL: 200; 5 SPRAY TOPICAL at 23:06

## 2021-12-16 RX ADMIN — SODIUM CHLORIDE, SODIUM LACTATE, POTASSIUM CHLORIDE, AND CALCIUM CHLORIDE 125 ML/HR: .6; .31; .03; .02 INJECTION, SOLUTION INTRAVENOUS at 07:58

## 2021-12-16 RX ADMIN — ONDANSETRON 4 MG: 2 INJECTION INTRAMUSCULAR; INTRAVENOUS at 15:16

## 2021-12-16 RX ADMIN — KETOROLAC TROMETHAMINE 30 MG: 30 INJECTION, SOLUTION INTRAMUSCULAR at 21:57

## 2021-12-16 RX ADMIN — METOCLOPRAMIDE 10 MG: 5 INJECTION, SOLUTION INTRAMUSCULAR; INTRAVENOUS at 16:44

## 2021-12-17 LAB
ALBUMIN SERPL BCP-MCNC: 2 G/DL (ref 3.5–5)
ALP SERPL-CCNC: 121 U/L (ref 46–116)
ALT SERPL W P-5'-P-CCNC: 18 U/L (ref 12–78)
ANION GAP SERPL CALCULATED.3IONS-SCNC: 10 MMOL/L (ref 4–13)
AST SERPL W P-5'-P-CCNC: 37 U/L (ref 5–45)
BASOPHILS # BLD AUTO: 0.03 THOUSANDS/ΜL (ref 0–0.1)
BASOPHILS NFR BLD AUTO: 0 % (ref 0–1)
BILIRUB SERPL-MCNC: 0.38 MG/DL (ref 0.2–1)
BUN SERPL-MCNC: 11 MG/DL (ref 5–25)
CALCIUM ALBUM COR SERPL-MCNC: 9.7 MG/DL (ref 8.3–10.1)
CALCIUM SERPL-MCNC: 8.1 MG/DL (ref 8.3–10.1)
CHLORIDE SERPL-SCNC: 107 MMOL/L (ref 100–108)
CO2 SERPL-SCNC: 22 MMOL/L (ref 21–32)
CREAT SERPL-MCNC: 0.95 MG/DL (ref 0.6–1.3)
EOSINOPHIL # BLD AUTO: 0.09 THOUSAND/ΜL (ref 0–0.61)
EOSINOPHIL NFR BLD AUTO: 1 % (ref 0–6)
ERYTHROCYTE [DISTWIDTH] IN BLOOD BY AUTOMATED COUNT: 14.7 % (ref 11.6–15.1)
GFR SERPL CREATININE-BSD FRML MDRD: 79 ML/MIN/1.73SQ M
GLUCOSE SERPL-MCNC: 127 MG/DL (ref 65–140)
HCT VFR BLD AUTO: 32.1 % (ref 34.8–46.1)
HGB BLD-MCNC: 10.6 G/DL (ref 11.5–15.4)
IMM GRANULOCYTES # BLD AUTO: 0.09 THOUSAND/UL (ref 0–0.2)
IMM GRANULOCYTES NFR BLD AUTO: 1 % (ref 0–2)
LYMPHOCYTES # BLD AUTO: 1.8 THOUSANDS/ΜL (ref 0.6–4.47)
LYMPHOCYTES NFR BLD AUTO: 13 % (ref 14–44)
MCH RBC QN AUTO: 29.9 PG (ref 26.8–34.3)
MCHC RBC AUTO-ENTMCNC: 33 G/DL (ref 31.4–37.4)
MCV RBC AUTO: 91 FL (ref 82–98)
MONOCYTES # BLD AUTO: 0.49 THOUSAND/ΜL (ref 0.17–1.22)
MONOCYTES NFR BLD AUTO: 4 % (ref 4–12)
NEUTROPHILS # BLD AUTO: 11.35 THOUSANDS/ΜL (ref 1.85–7.62)
NEUTS SEG NFR BLD AUTO: 81 % (ref 43–75)
NRBC BLD AUTO-RTO: 0 /100 WBCS
PLATELET # BLD AUTO: 148 THOUSANDS/UL (ref 149–390)
PMV BLD AUTO: 10.1 FL (ref 8.9–12.7)
POTASSIUM SERPL-SCNC: 4.2 MMOL/L (ref 3.5–5.3)
PROT SERPL-MCNC: 5.7 G/DL (ref 6.4–8.2)
RBC # BLD AUTO: 3.54 MILLION/UL (ref 3.81–5.12)
RPR SER QL: NORMAL
SODIUM SERPL-SCNC: 139 MMOL/L (ref 136–145)
WBC # BLD AUTO: 13.85 THOUSAND/UL (ref 4.31–10.16)

## 2021-12-17 PROCEDURE — 99024 POSTOP FOLLOW-UP VISIT: CPT | Performed by: STUDENT IN AN ORGANIZED HEALTH CARE EDUCATION/TRAINING PROGRAM

## 2021-12-17 PROCEDURE — 80053 COMPREHEN METABOLIC PANEL: CPT

## 2021-12-17 PROCEDURE — 85025 COMPLETE CBC W/AUTO DIFF WBC: CPT

## 2021-12-17 RX ORDER — OXYTOCIN/RINGER'S LACTATE 30/500 ML
62.5 PLASTIC BAG, INJECTION (ML) INTRAVENOUS
Status: ACTIVE | OUTPATIENT
Start: 2021-12-17 | End: 2021-12-17

## 2021-12-17 RX ADMIN — DOCUSATE SODIUM 100 MG: 100 CAPSULE ORAL at 09:45

## 2021-12-17 RX ADMIN — DOCUSATE SODIUM 100 MG: 100 CAPSULE ORAL at 17:41

## 2021-12-17 RX ADMIN — KETOROLAC TROMETHAMINE 30 MG: 30 INJECTION, SOLUTION INTRAMUSCULAR at 11:32

## 2021-12-17 RX ADMIN — ACETAMINOPHEN 650 MG: 325 TABLET, FILM COATED ORAL at 15:41

## 2021-12-17 RX ADMIN — ACETAMINOPHEN 650 MG: 325 TABLET, FILM COATED ORAL at 21:55

## 2021-12-17 RX ADMIN — ACETAMINOPHEN 650 MG: 325 TABLET, FILM COATED ORAL at 06:58

## 2021-12-17 RX ADMIN — POLYETHYLENE GLYCOL 3350 17 G: 17 POWDER, FOR SOLUTION ORAL at 09:45

## 2021-12-17 RX ADMIN — Medication 62.5 MILLI-UNITS/MIN: at 03:00

## 2021-12-17 RX ADMIN — KETOROLAC TROMETHAMINE 30 MG: 30 INJECTION, SOLUTION INTRAMUSCULAR at 17:41

## 2021-12-17 RX ADMIN — KETOROLAC TROMETHAMINE 30 MG: 30 INJECTION, SOLUTION INTRAMUSCULAR at 04:22

## 2021-12-18 LAB
ALBUMIN SERPL BCP-MCNC: 1.9 G/DL (ref 3.5–5)
ALP SERPL-CCNC: 104 U/L (ref 46–116)
ALT SERPL W P-5'-P-CCNC: 15 U/L (ref 12–78)
ANION GAP SERPL CALCULATED.3IONS-SCNC: 9 MMOL/L (ref 4–13)
AST SERPL W P-5'-P-CCNC: 29 U/L (ref 5–45)
BASOPHILS # BLD AUTO: 0.04 THOUSANDS/ΜL (ref 0–0.1)
BASOPHILS NFR BLD AUTO: 0 % (ref 0–1)
BILIRUB SERPL-MCNC: 0.23 MG/DL (ref 0.2–1)
BUN SERPL-MCNC: 11 MG/DL (ref 5–25)
CALCIUM ALBUM COR SERPL-MCNC: 10 MG/DL (ref 8.3–10.1)
CALCIUM SERPL-MCNC: 8.3 MG/DL (ref 8.3–10.1)
CHLORIDE SERPL-SCNC: 108 MMOL/L (ref 100–108)
CO2 SERPL-SCNC: 22 MMOL/L (ref 21–32)
CREAT SERPL-MCNC: 0.91 MG/DL (ref 0.6–1.3)
EOSINOPHIL # BLD AUTO: 0.24 THOUSAND/ΜL (ref 0–0.61)
EOSINOPHIL NFR BLD AUTO: 2 % (ref 0–6)
ERYTHROCYTE [DISTWIDTH] IN BLOOD BY AUTOMATED COUNT: 15 % (ref 11.6–15.1)
GFR SERPL CREATININE-BSD FRML MDRD: 83 ML/MIN/1.73SQ M
GLUCOSE SERPL-MCNC: 87 MG/DL (ref 65–140)
HCT VFR BLD AUTO: 29.7 % (ref 34.8–46.1)
HGB BLD-MCNC: 9.7 G/DL (ref 11.5–15.4)
IMM GRANULOCYTES # BLD AUTO: 0.09 THOUSAND/UL (ref 0–0.2)
IMM GRANULOCYTES NFR BLD AUTO: 1 % (ref 0–2)
LYMPHOCYTES # BLD AUTO: 2.67 THOUSANDS/ΜL (ref 0.6–4.47)
LYMPHOCYTES NFR BLD AUTO: 19 % (ref 14–44)
MCH RBC QN AUTO: 30.2 PG (ref 26.8–34.3)
MCHC RBC AUTO-ENTMCNC: 32.7 G/DL (ref 31.4–37.4)
MCV RBC AUTO: 93 FL (ref 82–98)
MONOCYTES # BLD AUTO: 0.66 THOUSAND/ΜL (ref 0.17–1.22)
MONOCYTES NFR BLD AUTO: 5 % (ref 4–12)
NEUTROPHILS # BLD AUTO: 10.14 THOUSANDS/ΜL (ref 1.85–7.62)
NEUTS SEG NFR BLD AUTO: 73 % (ref 43–75)
NRBC BLD AUTO-RTO: 0 /100 WBCS
PLATELET # BLD AUTO: 148 THOUSANDS/UL (ref 149–390)
PMV BLD AUTO: 9.9 FL (ref 8.9–12.7)
POTASSIUM SERPL-SCNC: 3.8 MMOL/L (ref 3.5–5.3)
PROT SERPL-MCNC: 5.3 G/DL (ref 6.4–8.2)
RBC # BLD AUTO: 3.21 MILLION/UL (ref 3.81–5.12)
SODIUM SERPL-SCNC: 139 MMOL/L (ref 136–145)
WBC # BLD AUTO: 13.84 THOUSAND/UL (ref 4.31–10.16)

## 2021-12-18 PROCEDURE — 80053 COMPREHEN METABOLIC PANEL: CPT | Performed by: OBSTETRICS & GYNECOLOGY

## 2021-12-18 PROCEDURE — 99024 POSTOP FOLLOW-UP VISIT: CPT | Performed by: STUDENT IN AN ORGANIZED HEALTH CARE EDUCATION/TRAINING PROGRAM

## 2021-12-18 PROCEDURE — 85025 COMPLETE CBC W/AUTO DIFF WBC: CPT | Performed by: OBSTETRICS & GYNECOLOGY

## 2021-12-18 RX ADMIN — KETOROLAC TROMETHAMINE 30 MG: 30 INJECTION, SOLUTION INTRAMUSCULAR at 00:01

## 2021-12-18 RX ADMIN — DOCUSATE SODIUM 100 MG: 100 CAPSULE ORAL at 09:27

## 2021-12-18 RX ADMIN — POLYETHYLENE GLYCOL 3350 17 G: 17 POWDER, FOR SOLUTION ORAL at 09:28

## 2021-12-18 RX ADMIN — ACETAMINOPHEN 650 MG: 325 TABLET, FILM COATED ORAL at 18:19

## 2021-12-18 RX ADMIN — OXYCODONE HYDROCHLORIDE 5 MG: 5 TABLET ORAL at 20:04

## 2021-12-18 RX ADMIN — ACETAMINOPHEN 650 MG: 325 TABLET, FILM COATED ORAL at 09:27

## 2021-12-18 RX ADMIN — DOCUSATE SODIUM 100 MG: 100 CAPSULE ORAL at 18:19

## 2021-12-18 RX ADMIN — ACETAMINOPHEN 650 MG: 325 TABLET, FILM COATED ORAL at 03:32

## 2021-12-19 VITALS
OXYGEN SATURATION: 98 % | DIASTOLIC BLOOD PRESSURE: 76 MMHG | HEIGHT: 67 IN | TEMPERATURE: 98.3 F | HEART RATE: 91 BPM | RESPIRATION RATE: 20 BRPM | SYSTOLIC BLOOD PRESSURE: 132 MMHG | WEIGHT: 235 LBS | BODY MASS INDEX: 36.88 KG/M2

## 2021-12-19 LAB
ALBUMIN SERPL BCP-MCNC: 2.1 G/DL (ref 3.5–5)
ALBUMIN SERPL BCP-MCNC: 2.4 G/DL (ref 3.5–5)
ALP SERPL-CCNC: 114 U/L (ref 46–116)
ALP SERPL-CCNC: 98 U/L (ref 46–116)
ALT SERPL W P-5'-P-CCNC: 15 U/L (ref 12–78)
ALT SERPL W P-5'-P-CCNC: 23 U/L (ref 12–78)
ANION GAP SERPL CALCULATED.3IONS-SCNC: 10 MMOL/L (ref 4–13)
ANION GAP SERPL CALCULATED.3IONS-SCNC: 7 MMOL/L (ref 4–13)
AST SERPL W P-5'-P-CCNC: 27 U/L (ref 5–45)
AST SERPL W P-5'-P-CCNC: 30 U/L (ref 5–45)
BILIRUB SERPL-MCNC: 0.29 MG/DL (ref 0.2–1)
BILIRUB SERPL-MCNC: 0.3 MG/DL (ref 0.2–1)
BUN SERPL-MCNC: 10 MG/DL (ref 5–25)
BUN SERPL-MCNC: 10 MG/DL (ref 5–25)
CALCIUM ALBUM COR SERPL-MCNC: 10 MG/DL (ref 8.3–10.1)
CALCIUM ALBUM COR SERPL-MCNC: 10.2 MG/DL (ref 8.3–10.1)
CALCIUM SERPL-MCNC: 8.5 MG/DL (ref 8.3–10.1)
CALCIUM SERPL-MCNC: 8.9 MG/DL (ref 8.3–10.1)
CHLORIDE SERPL-SCNC: 103 MMOL/L (ref 100–108)
CHLORIDE SERPL-SCNC: 109 MMOL/L (ref 100–108)
CO2 SERPL-SCNC: 24 MMOL/L (ref 21–32)
CO2 SERPL-SCNC: 26 MMOL/L (ref 21–32)
CREAT SERPL-MCNC: 0.89 MG/DL (ref 0.6–1.3)
CREAT SERPL-MCNC: 0.93 MG/DL (ref 0.6–1.3)
GFR SERPL CREATININE-BSD FRML MDRD: 81 ML/MIN/1.73SQ M
GFR SERPL CREATININE-BSD FRML MDRD: 86 ML/MIN/1.73SQ M
GLUCOSE SERPL-MCNC: 75 MG/DL (ref 65–140)
GLUCOSE SERPL-MCNC: 83 MG/DL (ref 65–140)
POTASSIUM SERPL-SCNC: 4 MMOL/L (ref 3.5–5.3)
POTASSIUM SERPL-SCNC: 4.3 MMOL/L (ref 3.5–5.3)
PROT SERPL-MCNC: 5.7 G/DL (ref 6.4–8.2)
PROT SERPL-MCNC: 6.7 G/DL (ref 6.4–8.2)
SODIUM SERPL-SCNC: 137 MMOL/L (ref 136–145)
SODIUM SERPL-SCNC: 142 MMOL/L (ref 136–145)

## 2021-12-19 PROCEDURE — 80053 COMPREHEN METABOLIC PANEL: CPT | Performed by: STUDENT IN AN ORGANIZED HEALTH CARE EDUCATION/TRAINING PROGRAM

## 2021-12-19 PROCEDURE — 80053 COMPREHEN METABOLIC PANEL: CPT

## 2021-12-19 PROCEDURE — 99024 POSTOP FOLLOW-UP VISIT: CPT | Performed by: OBSTETRICS & GYNECOLOGY

## 2021-12-19 RX ORDER — ACETAMINOPHEN 325 MG/1
650 TABLET ORAL EVERY 6 HOURS SCHEDULED
Refills: 0
Start: 2021-12-19 | End: 2022-03-31

## 2021-12-19 RX ORDER — SODIUM CHLORIDE, SODIUM LACTATE, POTASSIUM CHLORIDE, CALCIUM CHLORIDE 600; 310; 30; 20 MG/100ML; MG/100ML; MG/100ML; MG/100ML
125 INJECTION, SOLUTION INTRAVENOUS CONTINUOUS
Status: DISCONTINUED | OUTPATIENT
Start: 2021-12-19 | End: 2021-12-19

## 2021-12-19 RX ORDER — ACETAMINOPHEN AND CODEINE PHOSPHATE 120; 12 MG/5ML; MG/5ML
1 SOLUTION ORAL DAILY
Qty: 28 TABLET | Refills: 0 | Status: SHIPPED | OUTPATIENT
Start: 2021-12-19 | End: 2022-01-27 | Stop reason: SDUPTHER

## 2021-12-19 RX ADMIN — DOCUSATE SODIUM 100 MG: 100 CAPSULE ORAL at 09:14

## 2021-12-19 RX ADMIN — POLYETHYLENE GLYCOL 3350 17 G: 17 POWDER, FOR SOLUTION ORAL at 09:14

## 2021-12-19 RX ADMIN — ACETAMINOPHEN 650 MG: 325 TABLET, FILM COATED ORAL at 06:09

## 2021-12-21 ENCOUNTER — TELEPHONE (OUTPATIENT)
Dept: LABOR AND DELIVERY | Facility: HOSPITAL | Age: 31
End: 2021-12-21

## 2021-12-23 LAB — PLACENTA IN STORAGE: NORMAL

## 2021-12-27 ENCOUNTER — POSTPARTUM VISIT (OUTPATIENT)
Dept: OBGYN CLINIC | Facility: CLINIC | Age: 31
End: 2021-12-27

## 2021-12-27 VITALS — WEIGHT: 213.2 LBS | DIASTOLIC BLOOD PRESSURE: 80 MMHG | SYSTOLIC BLOOD PRESSURE: 132 MMHG | BODY MASS INDEX: 33.39 KG/M2

## 2021-12-27 DIAGNOSIS — Z87.59 HISTORY OF GESTATIONAL HYPERTENSION: ICD-10-CM

## 2021-12-27 PROBLEM — Z34.82 MULTIGRAVIDA IN SECOND TRIMESTER: Status: RESOLVED | Noted: 2021-06-24 | Resolved: 2021-12-27

## 2021-12-27 PROBLEM — O13.9 GESTATIONAL HYPERTENSION: Status: RESOLVED | Noted: 2021-12-16 | Resolved: 2021-12-27

## 2021-12-27 PROBLEM — O99.210 MATERNAL OBESITY AFFECTING PREGNANCY, ANTEPARTUM: Status: RESOLVED | Noted: 2021-10-14 | Resolved: 2021-12-27

## 2021-12-27 PROBLEM — O09.292: Status: RESOLVED | Noted: 2021-07-01 | Resolved: 2021-12-27

## 2021-12-27 PROBLEM — Z34.80 SUPERVISION OF OTHER NORMAL PREGNANCY: Status: RESOLVED | Noted: 2021-10-14 | Resolved: 2021-12-27

## 2021-12-27 PROCEDURE — 99024 POSTOP FOLLOW-UP VISIT: CPT | Performed by: STUDENT IN AN ORGANIZED HEALTH CARE EDUCATION/TRAINING PROGRAM

## 2021-12-27 RX ORDER — DOCUSATE SODIUM 100 MG/1
100 CAPSULE, LIQUID FILLED ORAL 2 TIMES DAILY
Qty: 60 CAPSULE | Refills: 3 | Status: SHIPPED | OUTPATIENT
Start: 2021-12-27 | End: 2022-03-31

## 2022-01-24 ENCOUNTER — POSTPARTUM VISIT (OUTPATIENT)
Dept: OBGYN CLINIC | Facility: CLINIC | Age: 32
End: 2022-01-24

## 2022-01-24 VITALS — WEIGHT: 210.8 LBS | BODY MASS INDEX: 33.02 KG/M2 | DIASTOLIC BLOOD PRESSURE: 72 MMHG | SYSTOLIC BLOOD PRESSURE: 108 MMHG

## 2022-01-24 DIAGNOSIS — Z87.59 HISTORY OF GESTATIONAL HYPERTENSION: ICD-10-CM

## 2022-01-24 PROCEDURE — 99024 POSTOP FOLLOW-UP VISIT: CPT | Performed by: STUDENT IN AN ORGANIZED HEALTH CARE EDUCATION/TRAINING PROGRAM

## 2022-01-24 NOTE — ASSESSMENT & PLAN NOTE
Well-healed, pain resolved  No episodes of incontinence  Reviewed in future pregnancy possibility of recurrence, primary , growth monitoring, 39 week induction options

## 2022-01-24 NOTE — PROGRESS NOTES
Assessment and Plan:  Postpartum visit  Problem List Items Addressed This Visit        Other     (spontaneous vaginal delivery) - Primary    Fourth degree perineal laceration     Well-healed, pain resolved  No episodes of incontinence  Reviewed in future pregnancy possibility of recurrence, primary , growth monitoring, 39 week induction options         History of gestational hypertension     Normotensive and asymptomatic             Pap smear due   Postpartum depression: low risk  Contraception: Micronor, can start at any point as >4 weeks out    She will return in 3-4 months for her yearly exam, sooner PRN    --------------------------------------------------  Chief Complaint   Patient presents with    Postpartum Care     6 week visit  pt inform she is feeling much better- can sit down pain free  no pressure  she is pumping and doing formula  picked up her OCP but has questions when she should start taking it        Subjective:   27 y o  female here for postpartum visit  She is s/p Vaginal delivery complicated by 4th degree perineal laceration on 2021   Antepartum complicated by gestational hypertension, anxiety, BMI 31  Labor course uncomplicated  Postpartum course complicated by 4th degree perineal laceration requiring repair in OR  Concerns today: feeling much better than last time! Able to sit without discomfort     Gender: male, named Efren Lesser:   Weight: 8lb 2 9oz  Her lochia has decreased  She is Breastfeeding, pumping, and Bottle feeding without problems  She denies postpartum blues/depression  [place]  Depression Scale Total: 1  Last Pap: 2021, NILM / HPV neg    We discussed contraceptive options in detail including birth control pills, patches, NuvaRing, DepoProvera, Mirena/Kyleena IUD, Nexplanon in detail  At this point she is interested in progesterone only pill       Objective:  /72   Wt 95 6 kg (210 lb 12 8 oz)   LMP 02/25/2021 (Exact Date)   Breastfeeding No   BMI 33 02 kg/m²   General:  NAD AAOx3  HEENT:  moist mucous membranes  Chest:  non-labored breathing  Breasts: no redness, erythema, or tenderness    Abdomen:  soft, non-distended     Speculum exam: Normal appearing external genitalia, normal hair distribution  Urethra well-suspended, no clitoromegaly noted  Vagina pink, moist, well-rugated  Scant lochia noted  Cervix without lesion       Suture noted dissolving at introitus, wiped away today  Rectal exam with good tone, no recto-vaginal defects otned    Extremity: warm and well-perfused, calves non-tender to palpation   Neuro: grossly normal  Skin: no rash noted

## 2022-02-19 ENCOUNTER — IMMUNIZATIONS (OUTPATIENT)
Dept: FAMILY MEDICINE CLINIC | Facility: HOSPITAL | Age: 32
End: 2022-02-19

## 2022-02-19 PROCEDURE — 91305 COVID-19 PFIZER VACC TRIS-SUCROSE GRAY CAP 0.3 ML: CPT

## 2022-02-19 PROCEDURE — 0051A COVID-19 PFIZER VACC TRIS-SUCROSE GRAY CAP 0.3 ML: CPT

## 2022-03-31 ENCOUNTER — OFFICE VISIT (OUTPATIENT)
Dept: INTERNAL MEDICINE CLINIC | Facility: CLINIC | Age: 32
End: 2022-03-31
Payer: COMMERCIAL

## 2022-03-31 VITALS
SYSTOLIC BLOOD PRESSURE: 136 MMHG | DIASTOLIC BLOOD PRESSURE: 84 MMHG | HEART RATE: 87 BPM | OXYGEN SATURATION: 98 % | BODY MASS INDEX: 34.84 KG/M2 | WEIGHT: 222 LBS | HEIGHT: 67 IN | TEMPERATURE: 99 F

## 2022-03-31 DIAGNOSIS — I87.2 VENOUS INSUFFICIENCY OF BOTH LOWER EXTREMITIES: Primary | ICD-10-CM

## 2022-03-31 DIAGNOSIS — Z00.00 ANNUAL PHYSICAL EXAM: ICD-10-CM

## 2022-03-31 PROCEDURE — 99213 OFFICE O/P EST LOW 20 MIN: CPT | Performed by: INTERNAL MEDICINE

## 2022-03-31 PROCEDURE — 99395 PREV VISIT EST AGE 18-39: CPT | Performed by: INTERNAL MEDICINE

## 2022-03-31 NOTE — PROGRESS NOTES
ADULT ANNUAL [address] [facility]    NAME: Juju Art  AGE: 27 y o  SEX: female  : 1990     DATE: 3/31/2022     Assessment and Plan:     Problem List Items Addressed This Visit        Cardiovascular and Mediastinum    Venous insufficiency of both lower extremities - Primary    Relevant Orders    Compression Stocking       Other    Annual physical exam    Body mass index 34 0-34 9, adult          Immunizations and preventive care screenings were discussed with patient today  Appropriate education was printed on patient's after visit summary  Counseling:  · Exercise: the importance of regular exercise/physical activity was discussed  Recommend exercise 3-5 times per week for at least 30 minutes  BMI Counseling: Body mass index is 34 77 kg/m²  The BMI is above normal  Nutrition recommendations include decreasing portion sizes, encouraging healthy choices of fruits and vegetables and decreasing fast food intake  Exercise recommendations include moderate physical activity 150 minutes/week  Rationale for BMI follow-up plan is due to patient being overweight or obese  Return if symptoms worsen or fail to improve  Chief Complaint:     Chief Complaint   Patient presents with    bruised ankle     pt has a bruised ankle and her leg hurts when she walks    bmi f/u      History of Present Illness:     Adult Annual Physical   Patient here for a comprehensive physical exam  The patient reports problems - leg pain  Diet and Physical Activity  · Diet/Nutrition: well balanced diet  · Exercise: moderate cardiovascular exercise  Depression Screening  PHQ-2/9 Depression Screening    Little interest or pleasure in doing things: 0 - not at all  Feeling down, depressed, or hopeless: 0 - not at all       [address] [facility]  · Sleep: sleeps well  · Hearing: normal - bilateral   · Vision: no vision problems     · Dental: no dental visits for >1 year  /[facility]N Health  · Last menstrual period: -  · Contraceptive method: oral contraceptives  · History of STDs?: no      Review of Systems:     Review of Systems   Constitutional: Negative for chills and fever  HENT: Negative for congestion, ear pain and sore throat  Eyes: Negative for pain  Respiratory: Negative for cough and shortness of breath  Cardiovascular: Negative for chest pain and leg swelling  Gastrointestinal: Negative for abdominal pain, nausea and vomiting  Endocrine: Negative for polyuria  Genitourinary: Negative for difficulty urinating, frequency and urgency  Musculoskeletal: Negative for arthralgias and back pain  Skin: Negative for rash  Neurological: Negative for weakness and headaches  Psychiatric/Behavioral: Negative for sleep disturbance  The patient is not nervous/anxious         Past Medical History:     Past Medical History:   Diagnosis Date    IUFD at less than 20 weeks of gestation     Urinary tract infection     hx of in the past    Varicella     hx of childhood chickenpox      Past Surgical History:     Past Surgical History:   Procedure Laterality Date    DILATION AND CURETTAGE OF UTERUS      NO PAST SURGERIES      CO REPAIR VAGINA/PERINEUM N/A 12/16/2021    Procedure: REPAIR LACERATION PERINEAL / VAGINAL;  Surgeon: Christine Philippe MD;  Location: [facility] ;  Service: Obstetrics    CO SURG RX MISSED ABORTN,1ST TRI N/A 1/3/2019    Procedure: DILATATION AND EVACUATION (D&E) (16 week fetal demise) ultra soung guidance;  Surgeon: Gera Gerardo MD;  [facility];  Service: Gynecology      Social History:     Social History     Socioeconomic History    Marital status: Unknown     Spouse name: None    Number of children: None    Years of education: None    Highest education level: None   Occupational History    None   Tobacco Use    Smoking status: Never Smoker    Smokeless tobacco: Never Used   Vaping Use    Vaping Use: Never used   Substance and Sexual Activity    Alcohol use: No    Drug use: No    Sexual activity: Yes     Partners: Male     Birth control/protection: None     Comment:     Other Topics Concern    None   Social History Narrative    None     Social Determinants of Health     Financial Resource Strain: Not on file   Food Insecurity: Not on file   Transportation Needs: Not on file   Physical Activity: Not on file   Stress: Not on file   Social Connections: Not on file   Intimate Partner Violence: Not on file   Housing Stability: Not on file      Family History:     Family History   Problem Relation Age of Onset    Diabetes Mother     Hypertension Mother     Breast cancer Mother     Diabetes Maternal Grandmother     Hypertension Maternal Grandmother     Kidney disease Maternal Grandmother     Cancer Maternal Grandfather     No Known Problems Paternal Grandmother     No Known Problems Paternal Grandfather     No Known Problems Father     No Known Problems Sister     No Known Problems Brother       Current Medications:     Current Outpatient Medications   Medication Sig Dispense Refill    norethindrone (Ortho Micronor) 0 35 MG tablet Take 1 tablet (0 35 mg total) by mouth daily for 28 days 90 tablet 3     No current facility-administered medications for this visit  Allergies:     No Known Allergies   Physical Exam:     /84 (BP Location: Right arm, Patient Position: Sitting, Cuff Size: Standard)   Pulse 87   Temp 99 °F (37 2 °C) (Axillary)   Ht 5' 7" (1 702 m)   Wt 101 kg (222 lb)   SpO2 98%   BMI 34 77 kg/m²     Physical Exam  Vitals and nursing note reviewed  Constitutional:       General: She is not in acute distress  Appearance: She is well-developed  HENT:      Head: Normocephalic and atraumatic  Right Ear: Tympanic membrane and ear canal normal       Left Ear: Tympanic membrane and ear canal normal       Mouth/Throat:      Pharynx: Oropharynx is clear  Eyes:      Extraocular Movements: Extraocular movements intact  Conjunctiva/sclera: Conjunctivae normal    Cardiovascular:      Rate and Rhythm: Normal rate and regular rhythm  Heart sounds: No murmur heard  Pulmonary:      Effort: Pulmonary effort is normal  No respiratory distress  Breath sounds: Normal breath sounds  Abdominal:      General: Abdomen is flat  Palpations: Abdomen is soft  Tenderness: There is no abdominal tenderness  Musculoskeletal:         General: Normal range of motion  Cervical back: Normal range of motion and neck supple  Skin:     General: Skin is warm  Neurological:      General: No focal deficit present  Mental Status: She is alert and oriented to person, place, and time            Dulce Sanchez MD   [address]

## 2022-03-31 NOTE — PATIENT INSTRUCTIONS

## 2022-03-31 NOTE — PROGRESS NOTES
Assessment/Plan:             1  Venous insufficiency of both lower extremities  -     Compression Stocking    2  Annual physical exam    3  Body mass index 34 0-34 9, adult           Subjective:      Patient ID: Leatha Nath is a 27 y o  female  Right leg pain, no trauma, also physical      The following portions of the patient's history were reviewed and updated as appropriate: She  has a past medical history of IUFD at less than 20 weeks of gestation, Urinary tract infection, and Varicella  She   Patient Active Problem List    Diagnosis Date Noted    Venous insufficiency of both lower extremities 2022    Body mass index 34 0-34 9, adult 2022    History of gestational hypertension 2021    Fourth degree perineal laceration 2021     (spontaneous vaginal delivery) 12/15/2021    Glucosuria 2021    Skin disease 2021    Anxiety during pregnancy 2021    Annual physical exam 2019     She  has a past surgical history that includes No past surgeries; pr surg rx missed abortn,1st tri (N/A, 1/3/2019); Dilation and curettage of uterus; and pr repair vagina/perineum (N/A, 2021)  Her family history includes Breast cancer in her mother; Cancer in her maternal grandfather; Diabetes in her maternal grandmother and mother; Hypertension in her maternal grandmother and mother; Kidney disease in her maternal grandmother; No Known Problems in her brother, father, paternal grandfather, paternal grandmother, and sister  She  reports that she has never smoked  She has never used smokeless tobacco  She reports that she does not drink alcohol and does not use drugs  Current Outpatient Medications   Medication Sig Dispense Refill    norethindrone (Ortho Micronor) 0 35 MG tablet Take 1 tablet (0 35 mg total) by mouth daily for 28 days 90 tablet 3     No current facility-administered medications for this visit       Current Outpatient Medications on File Prior to Visit Medication Sig    norethindrone (Ortho Micronor) 0 35 MG tablet Take 1 tablet (0 35 mg total) by mouth daily for 28 days    [DISCONTINUED] acetaminophen (TYLENOL) 325 mg tablet Take 2 tablets (650 mg total) by mouth every 6 (six) hours (Patient not taking: Reported on 1/24/2022 )    [DISCONTINUED] benzocaine-menthol-lanolin-aloe (DERMOPLAST) 20-0 5 % topical spray Apply 1 application topically 3 (three) times a day as needed for mild pain (Patient not taking: Reported on 1/24/2022 )    [DISCONTINUED] docusate sodium (COLACE) 100 mg capsule Take 1 capsule (100 mg total) by mouth 2 (two) times a day (Patient not taking: Reported on 1/24/2022 )    [DISCONTINUED] Prenatal MV-Min-Fe Fum-FA-DHA (PRENATAL+DHA PO) Take by mouth Will check if this has DHA (Patient not taking: Reported on 1/24/2022 )     No current facility-administered medications on file prior to visit  She has No Known Allergies       Review of Systems   Constitutional: Negative for chills and fever  HENT: Negative for congestion, ear pain and sore throat  Eyes: Negative for pain  Respiratory: Negative for cough and shortness of breath  Cardiovascular: Negative for chest pain and leg swelling  Gastrointestinal: Negative for abdominal pain, nausea and vomiting  Endocrine: Negative for polyuria  Genitourinary: Negative for difficulty urinating, frequency and urgency  Musculoskeletal: Negative for arthralgias and back pain  Skin: Negative for rash  Neurological: Negative for weakness and headaches  Psychiatric/Behavioral: Negative for sleep disturbance  The patient is not nervous/anxious  Objective:      /84 (BP Location: Right arm, Patient Position: Sitting, Cuff Size: Standard)   Pulse 87   Temp 99 °F (37 2 °C) (Axillary)   Ht 5' 7" (1 702 m)   Wt 101 kg (222 lb)   SpO2 98%   BMI 34 77 kg/m²     No results found for this or any previous visit (from the past 1344 hour(s))       Physical Exam  Constitutional:       Appearance: Normal appearance  HENT:      Head: Normocephalic  Right Ear: Tympanic membrane, ear canal and external ear normal       Left Ear: Tympanic membrane, ear canal and external ear normal       Nose: Nose normal  No congestion  Mouth/Throat:      Mouth: Mucous membranes are moist       Pharynx: Oropharynx is clear  No oropharyngeal exudate or posterior oropharyngeal erythema  Eyes:      Extraocular Movements: Extraocular movements intact  Conjunctiva/sclera: Conjunctivae normal       Pupils: Pupils are equal, round, and reactive to light  Cardiovascular:      Rate and Rhythm: Normal rate and regular rhythm  Heart sounds: Normal heart sounds  No murmur heard  Pulmonary:      Effort: Pulmonary effort is normal       Breath sounds: Normal breath sounds  No wheezing or rales  Abdominal:      General: Bowel sounds are normal  There is no distension  Palpations: Abdomen is soft  Tenderness: There is no abdominal tenderness  Musculoskeletal:         General: Normal range of motion  Cervical back: Normal range of motion and neck supple  Right lower leg: No edema  Left lower leg: No edema  Comments: Right lower leg medially spider vein present, no swelling, non tenderness,   Lymphadenopathy:      Cervical: No cervical adenopathy  Skin:     General: Skin is warm  Neurological:      General: No focal deficit present  Mental Status: She is alert and oriented to person, place, and time

## 2022-06-10 ENCOUNTER — ANNUAL EXAM (OUTPATIENT)
Dept: OBGYN CLINIC | Facility: CLINIC | Age: 32
End: 2022-06-10
Payer: COMMERCIAL

## 2022-06-10 VITALS
DIASTOLIC BLOOD PRESSURE: 62 MMHG | WEIGHT: 225 LBS | SYSTOLIC BLOOD PRESSURE: 128 MMHG | HEIGHT: 66 IN | BODY MASS INDEX: 36.16 KG/M2

## 2022-06-10 DIAGNOSIS — Z01.419 ENCOUNTER FOR ANNUAL ROUTINE GYNECOLOGICAL EXAMINATION: Primary | ICD-10-CM

## 2022-06-10 DIAGNOSIS — N39.3 STRESS INCONTINENCE IN FEMALE: ICD-10-CM

## 2022-06-10 DIAGNOSIS — R15.2 FECAL URGENCY: ICD-10-CM

## 2022-06-10 DIAGNOSIS — Z30.011 ENCOUNTER FOR INITIAL PRESCRIPTION OF CONTRACEPTIVE PILLS: ICD-10-CM

## 2022-06-10 PROBLEM — F41.9 ANXIETY DURING PREGNANCY: Status: RESOLVED | Noted: 2021-06-17 | Resolved: 2022-06-10

## 2022-06-10 PROBLEM — O99.340 ANXIETY DURING PREGNANCY: Status: RESOLVED | Noted: 2021-06-17 | Resolved: 2022-06-10

## 2022-06-10 PROCEDURE — 1036F TOBACCO NON-USER: CPT | Performed by: OBSTETRICS & GYNECOLOGY

## 2022-06-10 PROCEDURE — 3008F BODY MASS INDEX DOCD: CPT | Performed by: OBSTETRICS & GYNECOLOGY

## 2022-06-10 PROCEDURE — 99395 PREV VISIT EST AGE 18-39: CPT | Performed by: OBSTETRICS & GYNECOLOGY

## 2022-06-10 RX ORDER — NORETHINDRONE ACETATE AND ETHINYL ESTRADIOL 1.5-30(21)
1 KIT ORAL DAILY
Qty: 84 TABLET | Refills: 3 | Status: SHIPPED | OUTPATIENT
Start: 2022-06-10 | End: 2022-07-13

## 2022-06-10 NOTE — PROGRESS NOTES
Assessment/Plan:    1  Encounter for annual routine gynecological examination      2  Stress incontinence in female    - Ambulatory Referral to Physical Therapy; Future    3  Fourth degree perineal laceration    - Ambulatory Referral to Physical Therapy; Future    4  Fecal urgency    - Ambulatory Referral to Physical Therapy; Future    5  Encounter for initial prescription of contraceptive pills    - norethindrone-ethinyl estradiol-iron (Loestrin Fe 1 ) 1 5-30 MG-MCG tablet; Take 1 tablet by mouth daily  Dispense: 84 tablet; Refill: 3        Subjective      Thaddeus Trevino is a 34 y o  female who presents for annual exam  Periods are regular every 28-30 days since stopping pumping  Dysmenorrhea:none  Cyclic symptoms:  none  No intermenstrual bleeding, spotting, or discharge  The patient reports urinary leakage with cough or laugh as well as fecal urgency that results in incontinence if she waits too long to use toilet  No sexual concerns  Current contraception: oral progesterone-only contraceptive  History of abnormal Pap smear: no  Regular self breast exam: yes  History of abnormal mammogram: no  History of abnormal lipids: no  Menstrual History:  OB History        2    Para   1    Term   1       0    AB   1    Living   1       SAB   0    IAB   1    Ectopic   0    Multiple   0    Live Births   1                  Patient's last menstrual period was 2022 (exact date)       Past Medical History:   Diagnosis Date    IUFD at less than 20 weeks of gestation     Urinary tract infection     hx of in the past    Varicella     hx of childhood chickenpox       Family History   Problem Relation Age of Onset    Diabetes Mother     Hypertension Mother     Breast cancer Mother     Diabetes Maternal Grandmother     Hypertension Maternal Grandmother     Kidney disease Maternal Grandmother     Cancer Maternal Grandfather     No Known Problems Paternal Grandmother     No Known Problems Paternal Grandfather     No Known Problems Father     No Known Problems Sister     No Known Problems Brother        The following portions of the patient's history were reviewed and updated as appropriate: allergies, current medications, past family history, past medical history, past social history, past surgical history and problem list     Review of Systems  Pertinent items are noted in HPI  Objective      /62 (BP Location: Right arm, Patient Position: Sitting, Cuff Size: Large)   Ht 5' 5 75" (1 67 m)   Wt 102 kg (225 lb)   LMP 05/30/2022 (Exact Date)   Breastfeeding No   BMI 36 59 kg/m²     General:   alert and oriented, in no acute distress   Heart:    Breasts: regular rate and rhythm, S1, S2 normal, no murmur, click, rub or gallop   appear normal, no suspicious masses, no skin or nipple changes or axillary nodes  Lungs: clear to auscultation bilaterally   Abdomen: soft, non-tender, without masses or organomegaly   Vulva: normal   Vagina: normal mucosa   Cervix: no lesions   Uterus: normal size, mobile, non-tender   Adnexa:    Rectal: normal adnexa and no mass, fullness, tenderness     negative without mass, lesions or tenderness, sphincter tone normal but extremely thin

## 2022-07-13 DIAGNOSIS — Z30.011 ENCOUNTER FOR INITIAL PRESCRIPTION OF CONTRACEPTIVE PILLS: ICD-10-CM

## 2022-07-13 RX ORDER — NORETHINDRONE ACETATE AND ETHINYL ESTRADIOL AND FERROUS FUMARATE 1.5-30(21)
KIT ORAL
Qty: 84 TABLET | Refills: 4 | Status: SHIPPED | OUTPATIENT
Start: 2022-07-13

## 2023-10-12 ENCOUNTER — ANNUAL EXAM (OUTPATIENT)
Dept: OBGYN CLINIC | Facility: CLINIC | Age: 33
End: 2023-10-12
Payer: COMMERCIAL

## 2023-10-12 VITALS
WEIGHT: 226.6 LBS | BODY MASS INDEX: 35.56 KG/M2 | DIASTOLIC BLOOD PRESSURE: 88 MMHG | SYSTOLIC BLOOD PRESSURE: 112 MMHG | HEIGHT: 67 IN

## 2023-10-12 DIAGNOSIS — R15.9 INCONTINENCE OF FECES, UNSPECIFIED FECAL INCONTINENCE TYPE: ICD-10-CM

## 2023-10-12 DIAGNOSIS — Z30.011 ENCOUNTER FOR INITIAL PRESCRIPTION OF CONTRACEPTIVE PILLS: ICD-10-CM

## 2023-10-12 DIAGNOSIS — Z01.419 ROUTINE GYNECOLOGICAL EXAMINATION: Primary | ICD-10-CM

## 2023-10-12 DIAGNOSIS — N39.3 SUI (STRESS URINARY INCONTINENCE, FEMALE): ICD-10-CM

## 2023-10-12 DIAGNOSIS — Z87.59 HISTORY OF FOURTH DEGREE PERINEAL LACERATION: ICD-10-CM

## 2023-10-12 PROCEDURE — S0612 ANNUAL GYNECOLOGICAL EXAMINA: HCPCS | Performed by: PHYSICIAN ASSISTANT

## 2023-10-12 RX ORDER — NORETHINDRONE ACETATE AND ETHINYL ESTRADIOL 1.5-30(21)
1 KIT ORAL DAILY
Qty: 84 TABLET | Refills: 1 | Status: SHIPPED | OUTPATIENT
Start: 2023-10-12

## 2023-10-12 NOTE — PROGRESS NOTES
Assessment   28 y.o.  presenting for annual exam.     Plan   Diagnoses and all orders for this visit:    Routine gynecological examination    Normal findings on routine exam.  Encouraged 150 min of exercise per week. Reviewed balanced diet. Prenatal vitamin encouraged   Breast awareness/SBE encouraged    Encounter for initial prescription of contraceptive pills  -     norethindrone-ethinyl estradiol-iron (Junel FE 1.5/30) 1.5-30 MG-MCG tablet; Take 1 tablet by mouth daily    Denies contraindications to estrogen  Rx for Petr  sent to pharmacy as she was on this previously w/o SE. Compliance with daily pill taking reinforced. Reviewed management for missed pills. Reviewed possibility for irregular bleeding in first 3 months of pill use. Can call with concerns. Condoms recommended for STD prevention. Warning sings of use reviewed. She will report these immediately should they occur. Potential for drug interaction reviewed. Should notify all HCP of use to avoid dec efficacy     RTO in 3 months for pill check. Incontinence of feces, unspecified fecal incontinence type  -     Ambulatory referral to Physical Therapy; Future    Strongly encouraged pelvic floor PT to improve sx and pelvic floor health. Agreeable. JENNIFER (stress urinary incontinence, female)  -     Ambulatory referral to Physical Therapy; Future    History of fourth degree perineal laceration  -     Ambulatory referral to Physical Therapy; Future    Couple does desire more children but not for at least another year. Discussed benefit of pelvic floor PT in anticipation of this. Discussed indication for CS in next pregnancy given this hx. Pap - due   Mammo due @ 39     RTO 3 months for pill check. RTO one year for yearly exam or sooner as needed.     __________________________________________________________________    Subjective     Verrobert General is a 28 y.o. Prabhu Muniz presenting for annual exam.     Delivered via  with 4th degree laceration 2021. Has since noted fecal urgency and leakage. Uncomfortable rectal pressure noted during IC. No pain or bleeding. Has rare JENNIFER. Couple does desire more children but not for another year. Not actively trying to prevent pregnancy. Would like to restart OCP to regulate periods and prevent pregnancy until she is ready. SCREENING  Last Pap: 2021 NILM/hpv neg  Last Mammo: n/a  Last Colonoscopy: n/a     GYN    Periods are described as irregular - they occur monthly but is unsure of how far apart they are, lasting  up to a week . Dysmenorrhea:none. Cyclic symptoms include none    Sexually active: Yes - single partner -   Concerns: rectal pressure. Denies pain, bleeding, dryness  Contraception: None - as above noted     Hx Abnormal pap: denies  We reviewed ASCCP guidelines for Pap testing today. Gardasil: She has not completed the Gardasil series. Denies vaginal discharge, itching, odor, dyspareunia, pelvic pain and vulvar/vaginal symptoms    OB         Complaints: rare JENNIFER    Denies urgency, frequency, hematuria, change in stream, difficulty urinating. BREAST  Complaints: denies   Denies: breast lump, breast tenderness, nipple discharge, skin color change, and skin lesion(s)    Pertinent Family Hx:   Family hx of breast cancer: Mother   Family hx of ovarian cancer: no  Family hx of colon cancer: no      GENERAL  PMH reviewed/updated and is as below.      Past Medical History:   Diagnosis Date    IUFD at less than 20 weeks of gestation     Urinary tract infection     hx of in the past    Varicella     hx of childhood chickenpox       Past Surgical History:   Procedure Laterality Date    DILATION AND CURETTAGE OF UTERUS      NO PAST SURGERIES      [place] COLPOPERINEORRHAPHY SUTURE INJ VAGINA&/PERINEU N/A 2021    Procedure: REPAIR LACERATION PERINEAL / VAGINAL;  Surgeon: Dulce Zamorano MD;  Location: [facility] ;  Service: Obstetrics    [facility]  FIRST TRIMESTER SURGICAL N/A 1/3/2019    Procedure: DILATATION AND EVACUATION (D&E) (16 week fetal demise) ultra soung guidance;  Surgeon: Sami Porter MD;  [facility];  Service: Gynecology         Current Outpatient Medications:     norethindrone-ethinyl estradiol-iron (Junel FE 1.5/30) 1.5-30 MG-MCG tablet, Take 1 tablet by mouth daily, Disp: 84 tablet, Rfl: 1    No Known Allergies    Social History     Socioeconomic History    Marital status: Unknown     Spouse name: Not on file    Number of children: Not on file    Years of education: Not on file    Highest education level: Not on file   Occupational History    Not on file   Tobacco Use    Smoking status: Never    Smokeless tobacco: Never   Vaping Use    Vaping Use: Never used   Substance and Sexual Activity    Alcohol use: No    Drug use: No    Sexual activity: Yes     Partners: Male     Birth control/protection: None     Comment:     Other Topics Concern    Not on file   Social History Narrative    Not on file     Social Determinants of Health     Financial Resource Strain: Not on file   Food Insecurity: Not on file   Transportation Needs: Not on file   Physical Activity: Not on file   Stress: Not on file   Social Connections: Not on file   Intimate Partner Violence: Not on file   Housing Stability: Not on file       Review of Systems     Constitutional: Negative. Respiratory: Negative. Cardiovascular: Negative   Gastrointestinal: Negative   Breasts: As noted above. Genitourinary: As noted above. Psychiatric: Negative     Objective      /88 (BP Location: Left arm, Patient Position: Sitting, Cuff Size: Large)   Ht 5' 7" (1.702 m)   Wt 103 kg (226 lb 9.6 oz)   LMP 2023 (Approximate)   BMI 35.49 kg/m²     Physical Examination:    Patient appears well and is not in distress  Breasts are symmetrical without mass, tenderness, nipple discharge, skin changes or adenopathy.    Abdomen is soft and nontender without masses. External genitals are normal without lesions or rashes. Urethral meatus and urethra are normal  Bladder is normal to palpation  Vagina is without discharge or bleeding. No significant relaxation. Cervix is normal without discharge or lesion. Uterus is normal, mobile, nontender without palpable mass. Adnexa are normal, nontender, without palpable mass. Rectal exam w/o mass or nodularity. Sphincter tone is adequate but thin.

## 2023-10-12 NOTE — PROGRESS NOTES
Patient here for yearly exam.  Last Pap: 5/27/21 neg/neg  Currently Sexually Active-no issues  Declined STD testing  Birth Control: none  LMP: 9/30/23 approx  Gardasil: not completed  Patient has concerns with rectal area. She feels like she always needs to wipe since giving birth.

## 2024-04-04 DIAGNOSIS — Z30.011 ENCOUNTER FOR INITIAL PRESCRIPTION OF CONTRACEPTIVE PILLS: ICD-10-CM

## 2024-04-04 RX ORDER — NORETHINDRONE ACETATE AND ETHINYL ESTRADIOL AND FERROUS FUMARATE 1.5-30(21)
1 KIT ORAL DAILY
Qty: 84 TABLET | Refills: 1 | Status: SHIPPED | OUTPATIENT
Start: 2024-04-04

## 2024-08-26 ENCOUNTER — OFFICE VISIT (OUTPATIENT)
Dept: INTERNAL MEDICINE CLINIC | Facility: CLINIC | Age: 34
End: 2024-08-26
Payer: COMMERCIAL

## 2024-08-26 VITALS
WEIGHT: 224 LBS | HEART RATE: 69 BPM | SYSTOLIC BLOOD PRESSURE: 128 MMHG | DIASTOLIC BLOOD PRESSURE: 86 MMHG | BODY MASS INDEX: 35.16 KG/M2 | HEIGHT: 67 IN | TEMPERATURE: 98.9 F | OXYGEN SATURATION: 98 %

## 2024-08-26 DIAGNOSIS — J01.00 ACUTE MAXILLARY SINUSITIS, RECURRENCE NOT SPECIFIED: Primary | ICD-10-CM

## 2024-08-26 DIAGNOSIS — Z00.00 ANNUAL PHYSICAL EXAM: ICD-10-CM

## 2024-08-26 DIAGNOSIS — J30.89 NON-SEASONAL ALLERGIC RHINITIS DUE TO OTHER ALLERGIC TRIGGER: ICD-10-CM

## 2024-08-26 PROCEDURE — 99395 PREV VISIT EST AGE 18-39: CPT | Performed by: INTERNAL MEDICINE

## 2024-08-26 PROCEDURE — 99213 OFFICE O/P EST LOW 20 MIN: CPT | Performed by: INTERNAL MEDICINE

## 2024-08-26 RX ORDER — PREDNISONE 50 MG/1
50 TABLET ORAL DAILY
Qty: 7 TABLET | Refills: 0 | Status: SHIPPED | OUTPATIENT
Start: 2024-08-26 | End: 2024-09-02

## 2024-08-26 NOTE — PROGRESS NOTES
Adult Annual Physical  Name: Mell Acevedo      : 1990      MRN: [id number]  Encounter Provider: Ciara Smith MD  Encounter Date: 2024   Encounter department: [facility] INTERNAL MEDICINE [facility]    Assessment & Plan   1. Acute maxillary sinusitis, recurrence not specified  -     amoxicillin-clavulanate (AUGMENTIN) 875-125 mg per tablet; Take 1 tablet by mouth every 12 (twelve) hours for 7 days  2. Annual physical exam  3. Non-seasonal allergic rhinitis due to other allergic trigger  -     predniSONE 50 mg tablet; Take 1 tablet (50 mg total) by mouth daily for 7 days  Immunizations and preventive care screenings were discussed with patient today. Appropriate education was printed on patient's after visit summary.    Counseling:  Exercise: the importance of regular exercise/physical activity was discussed. Recommend exercise 3-5 times per week for at least 30 minutes.       Depression Screening and Follow-up Plan: Patient was screened for depression during today's encounter. They screened negative with a PHQ-2 score of 0.        History of Present Illness     Adult Annual Physical:  Patient presents for annual physical. physical.     Diet and Physical Activity:  - Diet/Nutrition: well balanced diet.  - Exercise: moderate cardiovascular exercise.    Depression Screening:  - PHQ-2 Score: 0    General Health:  - Sleep: sleeps well.  - Hearing: normal hearing bilateral ears.  - Vision: no vision problems.  - Dental: no dental visits for > 1 year.    /GYN Health:  - Follows with GYN: yes.   - History of STDs: no    Advanced Care Planning:  - Has an advanced directive?: no    - Has a durable medical POA?: yes    - ACP document given to patient?: yes      Review of Systems   Constitutional:  Negative for chills and fever.   HENT:  Positive for congestion. Negative for ear pain and sore throat.    Eyes:  Negative for pain.   Respiratory:  Negative for cough and shortness of breath.     Cardiovascular:  Negative for chest pain and leg swelling.   Gastrointestinal:  Negative for abdominal pain, nausea and vomiting.   Endocrine: Negative for polyuria.   Genitourinary:  Negative for difficulty urinating, frequency and urgency.   Musculoskeletal:  Negative for arthralgias and back pain.   Skin:  Negative for rash.   Neurological:  Negative for weakness and headaches.   Psychiatric/Behavioral:  Negative for sleep disturbance. The patient is not nervous/anxious.      Past Medical History   Past Medical History:   Diagnosis Date    IUFD at less than 20 weeks of gestation     Urinary tract infection     hx of in the past    Varicella     hx of childhood chickenpox     Past Surgical History:   Procedure Laterality Date    DILATION AND CURETTAGE OF UTERUS      NO PAST SURGERIES      [place] COLPOPERINEORRHAPHY SUTURE INJ VAGINA&/PERINEU N/A 2021    Procedure: REPAIR LACERATION PERINEAL / VAGINAL;  Surgeon: Ida Alberto MD;  Location: [facility] ;  Service: Obstetrics    [place] MISSED  FIRST TRIMESTER SURGICAL N/A 1/3/2019    Procedure: DILATATION AND EVACUATION (D&E) (16 week fetal demise) ultra soung guidance;  Surgeon: Barbi Ng MD;  [facility];  Service: Gynecology     Family History   Problem Relation Age of Onset    Diabetes Mother     Hypertension Mother     Breast cancer Mother     No Known Problems Father     No Known Problems Sister     No Known Problems Brother     Cancer Maternal Grandmother         Rectal    Diabetes Maternal Grandmother     Hypertension Maternal Grandmother     Kidney disease Maternal Grandmother     Cancer Maternal Grandfather         Lung    No Known Problems Paternal Grandmother     No Known Problems Paternal Grandfather     Colon cancer Neg Hx     Ovarian cancer Neg Hx      Current Outpatient Medications on File Prior to Visit   Medication Sig Dispense Refill    Zeina CISNEROS  1.5-30 MG-MCG tablet TAKE 1 TABLET BY MOUTH EVERY DAY (Patient not  "taking: Reported on 8/26/2024) 84 tablet 1     No current facility-administered medications on file prior to visit.   No Known Allergies   Current Outpatient Medications on File Prior to Visit   Medication Sig Dispense Refill    Zeina CISNEROS 1.5/30 1.5-30 MG-MCG tablet TAKE 1 TABLET BY MOUTH EVERY DAY (Patient not taking: Reported on 8/26/2024) 84 tablet 1     No current facility-administered medications on file prior to visit.      Social History     Tobacco Use    Smoking status: Never    Smokeless tobacco: Never   Vaping Use    Vaping status: Never Used   Substance and Sexual Activity    Alcohol use: No    Drug use: No    Sexual activity: Yes     Partners: Male     Birth control/protection: None     Comment:         Objective     /86 (BP Location: Left arm, Patient Position: Sitting, Cuff Size: Large)   Pulse 69   Temp 98.9 °F (37.2 °C) (Temporal)   Ht 5' 7\" (1.702 m)   Wt 102 kg (224 lb)   SpO2 98%   BMI 35.08 kg/m²     Physical Exam  Vitals and nursing note reviewed.   Constitutional:       General: She is not in acute distress.     Appearance: She is well-developed.   HENT:      Head: Normocephalic and atraumatic.   Eyes:      Conjunctiva/sclera: Conjunctivae normal.   Cardiovascular:      Rate and Rhythm: Normal rate and regular rhythm.      Heart sounds: No murmur heard.  Pulmonary:      Effort: Pulmonary effort is normal. No respiratory distress.      Breath sounds: Normal breath sounds.   Abdominal:      Palpations: Abdomen is soft.      Tenderness: There is no abdominal tenderness.   Musculoskeletal:         General: No swelling.      Cervical back: Neck supple.   Skin:     General: Skin is warm and dry.      Capillary Refill: Capillary refill takes less than 2 seconds.   Neurological:      Mental Status: She is alert.   Psychiatric:         Mood and Affect: Mood normal.         "

## 2024-08-26 NOTE — PROGRESS NOTES
Assessment/Plan:      Depression Screening and Follow-up Plan: Patient was screened for depression during today's encounter. They screened negative with a PHQ-2 score of 0.            1. Acute maxillary sinusitis, recurrence not specified  -     amoxicillin-clavulanate (AUGMENTIN) 875-125 mg per tablet; Take 1 tablet by mouth every 12 (twelve) hours for 7 days  2. Annual physical exam  3. Non-seasonal allergic rhinitis due to other allergic trigger  -     predniSONE 50 mg tablet; Take 1 tablet (50 mg total) by mouth daily for 7 days         Subjective:      Patient ID: Mell Acevedo is a 34 y.o. female.    Congestion, right ear fullness, also physical        The following portions of the patient's history were reviewed and updated as appropriate: She  has a past medical history of IUFD at less than 20 weeks of gestation, Urinary tract infection, and Varicella.  She   Patient Active Problem List    Diagnosis Date Noted    Acute maxillary sinusitis 2024    Stress incontinence in female 06/10/2022    Fecal urgency 06/10/2022    Venous insufficiency of both lower extremities 2022    Body mass index 34.0-34.9, adult 2022    History of gestational hypertension 2021    Glucosuria 2021    Skin disease 2021    Annual physical exam 2019     She  has a past surgical history that includes No past surgeries; pr tx missed  first trimester surgical (N/A, 1/3/2019); Dilation and curettage of uterus; and pr colpoperineorrhaphy suture inj vagina&/perineu (N/A, 2021).  Her family history includes Breast cancer in her mother; Cancer in her maternal grandfather and maternal grandmother; Diabetes in her maternal grandmother and mother; Hypertension in her maternal grandmother and mother; Kidney disease in her maternal grandmother; No Known Problems in her brother, father, paternal grandfather, paternal grandmother, and sister.  She  reports that she has never smoked. She has  "never used smokeless tobacco. She reports that she does not drink alcohol and does not use drugs.  Current Outpatient Medications   Medication Sig Dispense Refill    amoxicillin-clavulanate (AUGMENTIN) 875-125 mg per tablet Take 1 tablet by mouth every 12 (twelve) hours for 7 days 14 tablet 0    predniSONE 50 mg tablet Take 1 tablet (50 mg total) by mouth daily for 7 days 7 tablet 0    Zeina FE 1.5/30 1.5-30 MG-MCG tablet TAKE 1 TABLET BY MOUTH EVERY DAY (Patient not taking: Reported on 8/26/2024) 84 tablet 1     No current facility-administered medications for this visit.     Current Outpatient Medications on File Prior to Visit   Medication Sig    Zeina FE 1.5/30 1.5-30 MG-MCG tablet TAKE 1 TABLET BY MOUTH EVERY DAY (Patient not taking: Reported on 8/26/2024)     No current facility-administered medications on file prior to visit.     She has No Known Allergies..    Review of Systems   Constitutional:  Negative for chills and fever.   HENT:  Positive for congestion. Negative for ear pain and sore throat.    Eyes:  Negative for pain.   Respiratory:  Negative for cough and shortness of breath.    Cardiovascular:  Negative for chest pain and leg swelling.   Gastrointestinal:  Negative for abdominal pain, nausea and vomiting.   Endocrine: Negative for polyuria.   Genitourinary:  Negative for difficulty urinating, frequency and urgency.   Musculoskeletal:  Negative for arthralgias and back pain.   Skin:  Negative for rash.   Neurological:  Negative for weakness and headaches.   Psychiatric/Behavioral:  Negative for sleep disturbance. The patient is not nervous/anxious.          Objective:      /86 (BP Location: Left arm, Patient Position: Sitting, Cuff Size: Large)   Pulse 69   Temp 98.9 °F (37.2 °C) (Temporal)   Ht 5' 7\" (1.702 m)   Wt 102 kg (224 lb)   SpO2 98%   BMI 35.08 kg/m²     No results found for this or any previous visit (from the past 1344 hour(s)).     Physical Exam  Constitutional:       " Appearance: Normal appearance.   HENT:      Head: Normocephalic.      Right Ear: Tympanic membrane, ear canal and external ear normal.      Left Ear: Tympanic membrane, ear canal and external ear normal.      Nose: Nose normal. No congestion.      Mouth/Throat:      Mouth: Mucous membranes are moist.      Pharynx: Oropharynx is clear. No oropharyngeal exudate or posterior oropharyngeal erythema.   Eyes:      Extraocular Movements: Extraocular movements intact.      Conjunctiva/sclera: Conjunctivae normal.   Cardiovascular:      Rate and Rhythm: Normal rate and regular rhythm.      Heart sounds: Normal heart sounds. No murmur heard.  Pulmonary:      Effort: Pulmonary effort is normal.      Breath sounds: Normal breath sounds. No wheezing or rales.   Abdominal:      General: Abdomen is flat. There is no distension.      Palpations: Abdomen is soft.      Tenderness: There is no abdominal tenderness.   Musculoskeletal:         General: Normal range of motion.      Cervical back: Normal range of motion and neck supple.      Right lower leg: No edema.      Left lower leg: No edema.   Lymphadenopathy:      Cervical: No cervical adenopathy.   Skin:     General: Skin is warm.   Neurological:      General: No focal deficit present.      Mental Status: She is alert and oriented to person, place, and time.

## 2024-08-26 NOTE — PATIENT INSTRUCTIONS
"Patient Education     Routine physical for adults   The Basics   Written by the doctors and editors at [facility]   What is a physical? -- A physical is a routine visit, or \"check-up,\" with your doctor. You might also hear it called a \"wellness visit\" or \"preventive visit.\"  During each visit, the doctor will:   Ask about your physical and mental health   Ask about your habits, behaviors, and lifestyle   Do an exam   Give you vaccines if needed   Talk to you about any medicines you take   Give advice about your health   Answer your questions  Getting regular check-ups is an important part of taking care of your health. It can help your doctor find and treat any problems you have. But it's also important for preventing health problems.  A routine physical is different from a \"sick visit.\" A sick visit is when you see a doctor because of a health concern or problem. Since physicals are scheduled ahead of time, you can think about what you want to ask the doctor.  How often should I get a physical? -- It depends on your age and health. In general, for people age 21 years and older:   If you are younger than 50 years, you might be able to get a physical every 3 years.   If you are 50 years or older, your doctor might recommend a physical every year.  If you have an ongoing health condition, like diabetes or high blood pressure, your doctor will probably want to see you more often.  What happens during a physical? -- In general, each visit will include:   Physical exam - The doctor or nurse will check your height, weight, heart rate, and blood pressure. They will also look at your eyes and ears. They will ask about how you are feeling and whether you have any symptoms that bother you.   Medicines - It's a good idea to bring a list of all the medicines you take to each doctor visit. Your doctor will talk to you about your medicines and answer any questions. Tell them if you are having any side effects that bother you. You " "should also tell them if you are having trouble paying for any of your medicines.   Habits and behaviors - This includes:   Your diet   Your exercise habits   Whether you smoke, drink alcohol, or use drugs   Whether you are sexually active   Whether you feel safe at home  Your doctor will talk to you about things you can do to improve your health and lower your risk of health problems. They will also offer help and support. For example, if you want to quit smoking, they can give you advice and might prescribe medicines. If you want to improve your diet or get more physical activity, they can help you with this, too.   Lab tests, if needed - The tests you get will depend on your age and situation. For example, your doctor might want to check your:   Cholesterol   Blood sugar   Iron level   Vaccines - The recommended vaccines will depend on your age, health, and what vaccines you already had. Vaccines are very important because they can prevent certain serious or deadly infections.   Discussion of screening - \"Screening\" means checking for diseases or other health problems before they cause symptoms. Your doctor can recommend screening based on your age, risk, and preferences. This might include tests to check for:   Cancer, such as breast, prostate, cervical, ovarian, colorectal, prostate, lung, or skin cancer   Sexually transmitted infections, such as chlamydia and gonorrhea   Mental health conditions like depression and anxiety  Your doctor will talk to you about the different types of screening tests. They can help you decide which screenings to have. They can also explain what the results might mean.   Answering questions - The physical is a good time to ask the doctor or nurse questions about your health. If needed, they can refer you to other doctors or specialists, too.  Adults older than 65 years often need other care, too. As you get older, your doctor will talk to you about:   How to prevent falling at " home   Hearing or vision tests   Memory testing   How to take your medicines safely   Making sure that you have the help and support you need at home  All topics are updated as new evidence becomes available and our peer review process is complete.  This topic retrieved from [employer] on: May 02, 2024.  Topic 679250 Version 1.0  Release: 32.4.3 - C32.122  © 2024 UpToDate, Inc. and/or its affiliates. All rights reserved.  Consumer Information Use and Disclaimer   Disclaimer: This generalized information is a limited summary of diagnosis, treatment, and/or medication information. It is not meant to be comprehensive and should be used as a tool to help the user understand and/or assess potential diagnostic and treatment options. It does NOT include all information about conditions, treatments, medications, side effects, or risks that may apply to a specific patient. It is not intended to be medical advice or a substitute for the medical advice, diagnosis, or treatment of a health care provider based on the health care provider's examination and assessment of a patient's specific and unique circumstances. Patients must speak with a health care provider for complete information about their health, medical questions, and treatment options, including any risks or benefits regarding use of medications. This information does not endorse any treatments or medications as safe, effective, or approved for treating a specific patient. UpToDate, Inc. and its affiliates disclaim any warranty or liability relating to this information or the use thereof.The use of this information is governed by the Terms of Use, available at https://www.woltersZeroDesktopuwer.com/en/know/clinical-effectiveness-terms. 2024© UpToDate, Inc. and its affiliates and/or licensors. All rights reserved.  Copyright   © 2024 UpToDate, Inc. and/or its affiliates. All rights reserved.

## (undated) DEVICE — SUT VICRYL 2-0 CT-1 27 IN J259H

## (undated) DEVICE — GLOVE INDICATOR PI UNDERGLOVE SZ 7 BLUE

## (undated) DEVICE — SUT MONOCRYL 4-0 PS-2 27 IN Y426H

## (undated) DEVICE — CATH FOLEY COUDE 16FR 5ML 2 WAY TIEMANN LUBRICATH

## (undated) DEVICE — CURETTE VACURETTE CRVD 14MM

## (undated) DEVICE — BETHLEHEM UNIVERSAL MINOR VAG: Brand: CARDINAL HEALTH

## (undated) DEVICE — SUT VICRYL 3-0 SH 27 IN J416H

## (undated) DEVICE — GLOVE PI ULTRA TOUCH SZ.7.0

## (undated) DEVICE — SPONGE LAP 18 X 18 IN